# Patient Record
Sex: MALE | Race: WHITE | NOT HISPANIC OR LATINO | URBAN - METROPOLITAN AREA
[De-identification: names, ages, dates, MRNs, and addresses within clinical notes are randomized per-mention and may not be internally consistent; named-entity substitution may affect disease eponyms.]

---

## 2022-04-14 ENCOUNTER — OFFICE (AMBULATORY)
Dept: URBAN - METROPOLITAN AREA CLINIC 64 | Facility: CLINIC | Age: 42
End: 2022-04-14

## 2022-04-14 VITALS — SYSTOLIC BLOOD PRESSURE: 102 MMHG | DIASTOLIC BLOOD PRESSURE: 55 MMHG | HEART RATE: 80 BPM | HEIGHT: 69 IN

## 2022-04-14 DIAGNOSIS — R10.84 GENERALIZED ABDOMINAL PAIN: ICD-10-CM

## 2022-04-14 DIAGNOSIS — K62.5 HEMORRHAGE OF ANUS AND RECTUM: ICD-10-CM

## 2022-04-14 PROCEDURE — 99204 OFFICE O/P NEW MOD 45 MIN: CPT | Performed by: NURSE PRACTITIONER

## 2022-04-14 RX ORDER — DICYCLOMINE HYDROCHLORIDE 10 MG/1
CAPSULE ORAL
Qty: 240 | Refills: 11 | Status: ACTIVE
Start: 2022-04-14

## 2022-04-27 ENCOUNTER — ON CAMPUS - OUTPATIENT (AMBULATORY)
Dept: URBAN - METROPOLITAN AREA HOSPITAL 2 | Facility: HOSPITAL | Age: 42
End: 2022-04-27

## 2022-04-27 ENCOUNTER — OFFICE (AMBULATORY)
Dept: URBAN - METROPOLITAN AREA PATHOLOGY 4 | Facility: PATHOLOGY | Age: 42
End: 2022-04-27

## 2022-04-27 VITALS
HEIGHT: 69 IN | OXYGEN SATURATION: 100 % | DIASTOLIC BLOOD PRESSURE: 58 MMHG | DIASTOLIC BLOOD PRESSURE: 64 MMHG | DIASTOLIC BLOOD PRESSURE: 62 MMHG | OXYGEN SATURATION: 95 % | SYSTOLIC BLOOD PRESSURE: 123 MMHG | DIASTOLIC BLOOD PRESSURE: 63 MMHG | HEART RATE: 73 BPM | DIASTOLIC BLOOD PRESSURE: 61 MMHG | DIASTOLIC BLOOD PRESSURE: 75 MMHG | SYSTOLIC BLOOD PRESSURE: 105 MMHG | HEART RATE: 75 BPM | OXYGEN SATURATION: 97 % | HEART RATE: 63 BPM | HEART RATE: 77 BPM | RESPIRATION RATE: 16 BRPM | RESPIRATION RATE: 18 BRPM | HEART RATE: 84 BPM | SYSTOLIC BLOOD PRESSURE: 111 MMHG | SYSTOLIC BLOOD PRESSURE: 118 MMHG | RESPIRATION RATE: 20 BRPM | WEIGHT: 169 LBS | HEART RATE: 66 BPM | DIASTOLIC BLOOD PRESSURE: 65 MMHG | SYSTOLIC BLOOD PRESSURE: 99 MMHG | HEART RATE: 70 BPM | DIASTOLIC BLOOD PRESSURE: 67 MMHG | SYSTOLIC BLOOD PRESSURE: 98 MMHG | SYSTOLIC BLOOD PRESSURE: 100 MMHG | HEART RATE: 81 BPM | SYSTOLIC BLOOD PRESSURE: 114 MMHG | TEMPERATURE: 98 F | OXYGEN SATURATION: 96 %

## 2022-04-27 DIAGNOSIS — K62.5 HEMORRHAGE OF ANUS AND RECTUM: ICD-10-CM

## 2022-04-27 DIAGNOSIS — R10.84 GENERALIZED ABDOMINAL PAIN: ICD-10-CM

## 2022-04-27 DIAGNOSIS — K57.30 DIVERTICULOSIS OF LARGE INTESTINE WITHOUT PERFORATION OR ABS: ICD-10-CM

## 2022-04-27 DIAGNOSIS — R19.4 CHANGE IN BOWEL HABIT: ICD-10-CM

## 2022-04-27 DIAGNOSIS — K64.1 SECOND DEGREE HEMORRHOIDS: ICD-10-CM

## 2022-04-27 DIAGNOSIS — R19.7 DIARRHEA, UNSPECIFIED: ICD-10-CM

## 2022-04-27 LAB
GI HISTOLOGY: A. UNSPECIFIED: (no result)
GI HISTOLOGY: PDF REPORT: (no result)

## 2022-04-27 PROCEDURE — 45380 COLONOSCOPY AND BIOPSY: CPT | Performed by: INTERNAL MEDICINE

## 2022-04-27 PROCEDURE — 88305 TISSUE EXAM BY PATHOLOGIST: CPT | Performed by: INTERNAL MEDICINE

## 2022-06-20 ENCOUNTER — OFFICE (AMBULATORY)
Dept: URBAN - METROPOLITAN AREA CLINIC 64 | Facility: CLINIC | Age: 42
End: 2022-06-20

## 2022-06-20 VITALS
WEIGHT: 164 LBS | HEIGHT: 69 IN | HEART RATE: 80 BPM | DIASTOLIC BLOOD PRESSURE: 68 MMHG | SYSTOLIC BLOOD PRESSURE: 108 MMHG

## 2022-06-20 DIAGNOSIS — R10.84 GENERALIZED ABDOMINAL PAIN: ICD-10-CM

## 2022-06-20 PROCEDURE — 99214 OFFICE O/P EST MOD 30 MIN: CPT | Performed by: NURSE PRACTITIONER

## 2022-06-20 RX ORDER — RIFAXIMIN 550 MG/1
TABLET ORAL
Qty: 42 | Refills: 2 | Status: COMPLETED
Start: 2022-06-20 | End: 2022-08-05

## 2022-08-05 ENCOUNTER — OFFICE (AMBULATORY)
Dept: URBAN - METROPOLITAN AREA CLINIC 64 | Facility: CLINIC | Age: 42
End: 2022-08-05

## 2022-08-05 VITALS
HEIGHT: 69 IN | DIASTOLIC BLOOD PRESSURE: 82 MMHG | WEIGHT: 164 LBS | HEART RATE: 76 BPM | SYSTOLIC BLOOD PRESSURE: 128 MMHG

## 2022-08-05 DIAGNOSIS — K62.5 HEMORRHAGE OF ANUS AND RECTUM: ICD-10-CM

## 2022-08-05 DIAGNOSIS — K58.0 IRRITABLE BOWEL SYNDROME WITH DIARRHEA: ICD-10-CM

## 2022-08-05 DIAGNOSIS — K64.1 SECOND DEGREE HEMORRHOIDS: ICD-10-CM

## 2022-08-05 DIAGNOSIS — R14.0 ABDOMINAL DISTENSION (GASEOUS): ICD-10-CM

## 2022-08-05 DIAGNOSIS — R10.84 GENERALIZED ABDOMINAL PAIN: ICD-10-CM

## 2022-08-05 DIAGNOSIS — K21.9 GASTRO-ESOPHAGEAL REFLUX DISEASE WITHOUT ESOPHAGITIS: ICD-10-CM

## 2022-08-05 PROCEDURE — 99213 OFFICE O/P EST LOW 20 MIN: CPT

## 2022-08-05 RX ORDER — RIFAXIMIN 550 MG/1
TABLET ORAL
Qty: 42 | Refills: 0 | Status: COMPLETED
End: 2022-10-21

## 2022-10-06 ENCOUNTER — OFFICE VISIT (OUTPATIENT)
Dept: FAMILY MEDICINE CLINIC | Facility: CLINIC | Age: 42
End: 2022-10-06

## 2022-10-06 ENCOUNTER — LAB (OUTPATIENT)
Dept: FAMILY MEDICINE CLINIC | Facility: CLINIC | Age: 42
End: 2022-10-06

## 2022-10-06 VITALS
HEART RATE: 95 BPM | RESPIRATION RATE: 16 BRPM | BODY MASS INDEX: 22.6 KG/M2 | HEIGHT: 69 IN | OXYGEN SATURATION: 97 % | DIASTOLIC BLOOD PRESSURE: 76 MMHG | SYSTOLIC BLOOD PRESSURE: 130 MMHG | TEMPERATURE: 98 F | WEIGHT: 152.6 LBS

## 2022-10-06 DIAGNOSIS — F41.1 GENERALIZED ANXIETY DISORDER: ICD-10-CM

## 2022-10-06 DIAGNOSIS — R63.4 UNINTENTIONAL WEIGHT LOSS: Primary | ICD-10-CM

## 2022-10-06 DIAGNOSIS — F17.210 TOBACCO DEPENDENCE DUE TO CIGARETTES: ICD-10-CM

## 2022-10-06 DIAGNOSIS — Z20.2 POSSIBLE EXPOSURE TO STD: ICD-10-CM

## 2022-10-06 DIAGNOSIS — R06.09 DYSPNEA ON EXERTION: ICD-10-CM

## 2022-10-06 DIAGNOSIS — R63.4 UNINTENTIONAL WEIGHT LOSS: ICD-10-CM

## 2022-10-06 PROBLEM — Z00.00 ANNUAL PHYSICAL EXAM: Status: ACTIVE | Noted: 2022-10-06

## 2022-10-06 LAB
ALBUMIN SERPL-MCNC: 4.9 G/DL (ref 3.5–5.2)
ALBUMIN/GLOB SERPL: 2.6 G/DL
ALP SERPL-CCNC: 80 U/L (ref 39–117)
ALT SERPL W P-5'-P-CCNC: 12 U/L (ref 1–41)
ANION GAP SERPL CALCULATED.3IONS-SCNC: 8 MMOL/L (ref 5–15)
AST SERPL-CCNC: 17 U/L (ref 1–40)
BASOPHILS # BLD AUTO: 0.06 10*3/MM3 (ref 0–0.2)
BASOPHILS NFR BLD AUTO: 0.7 % (ref 0–1.5)
BILIRUB SERPL-MCNC: 0.5 MG/DL (ref 0–1.2)
BILIRUB UR QL STRIP: NEGATIVE
BUN SERPL-MCNC: 8 MG/DL (ref 6–20)
BUN/CREAT SERPL: 9.6 (ref 7–25)
C TRACH RRNA CVX QL NAA+PROBE: NOT DETECTED
CALCIUM SPEC-SCNC: 9.5 MG/DL (ref 8.6–10.5)
CHLORIDE SERPL-SCNC: 102 MMOL/L (ref 98–107)
CLARITY UR: ABNORMAL
CO2 SERPL-SCNC: 30 MMOL/L (ref 22–29)
COLOR UR: YELLOW
CREAT SERPL-MCNC: 0.83 MG/DL (ref 0.76–1.27)
DEPRECATED RDW RBC AUTO: 43.1 FL (ref 37–54)
EGFRCR SERPLBLD CKD-EPI 2021: 112.8 ML/MIN/1.73
EOSINOPHIL # BLD AUTO: 0.09 10*3/MM3 (ref 0–0.4)
EOSINOPHIL NFR BLD AUTO: 1 % (ref 0.3–6.2)
ERYTHROCYTE [DISTWIDTH] IN BLOOD BY AUTOMATED COUNT: 14.1 % (ref 12.3–15.4)
GLOBULIN UR ELPH-MCNC: 1.9 GM/DL
GLUCOSE SERPL-MCNC: 87 MG/DL (ref 65–99)
GLUCOSE UR STRIP-MCNC: NEGATIVE MG/DL
HCT VFR BLD AUTO: 49.7 % (ref 37.5–51)
HGB BLD-MCNC: 17.2 G/DL (ref 13–17.7)
HGB UR QL STRIP.AUTO: NEGATIVE
IMM GRANULOCYTES # BLD AUTO: 0.02 10*3/MM3 (ref 0–0.05)
IMM GRANULOCYTES NFR BLD AUTO: 0.2 % (ref 0–0.5)
KETONES UR QL STRIP: NEGATIVE
LEUKOCYTE ESTERASE UR QL STRIP.AUTO: NEGATIVE
LYMPHOCYTES # BLD AUTO: 2.07 10*3/MM3 (ref 0.7–3.1)
LYMPHOCYTES NFR BLD AUTO: 23.1 % (ref 19.6–45.3)
MCH RBC QN AUTO: 29.9 PG (ref 26.6–33)
MCHC RBC AUTO-ENTMCNC: 34.6 G/DL (ref 31.5–35.7)
MCV RBC AUTO: 86.4 FL (ref 79–97)
MONOCYTES # BLD AUTO: 0.56 10*3/MM3 (ref 0.1–0.9)
MONOCYTES NFR BLD AUTO: 6.3 % (ref 5–12)
N GONORRHOEA RRNA SPEC QL NAA+PROBE: NOT DETECTED
NEUTROPHILS NFR BLD AUTO: 6.15 10*3/MM3 (ref 1.7–7)
NEUTROPHILS NFR BLD AUTO: 68.7 % (ref 42.7–76)
NITRITE UR QL STRIP: NEGATIVE
NRBC BLD AUTO-RTO: 0 /100 WBC (ref 0–0.2)
PH UR STRIP.AUTO: 6 [PH] (ref 5–8)
PLATELET # BLD AUTO: 331 10*3/MM3 (ref 140–450)
PMV BLD AUTO: 10.1 FL (ref 6–12)
POTASSIUM SERPL-SCNC: 3.8 MMOL/L (ref 3.5–5.2)
PROT SERPL-MCNC: 6.8 G/DL (ref 6–8.5)
PROT UR QL STRIP: ABNORMAL
RBC # BLD AUTO: 5.75 10*6/MM3 (ref 4.14–5.8)
SODIUM SERPL-SCNC: 140 MMOL/L (ref 136–145)
SP GR UR STRIP: 1.03 (ref 1–1.03)
TSH SERPL DL<=0.05 MIU/L-ACNC: 1.74 UIU/ML (ref 0.27–4.2)
UROBILINOGEN UR QL STRIP: ABNORMAL
VIT B12 BLD-MCNC: 269 PG/ML (ref 211–946)
WBC NRBC COR # BLD: 8.95 10*3/MM3 (ref 3.4–10.8)

## 2022-10-06 PROCEDURE — 82607 VITAMIN B-12: CPT | Performed by: PHYSICIAN ASSISTANT

## 2022-10-06 PROCEDURE — 87591 N.GONORRHOEAE DNA AMP PROB: CPT | Performed by: PHYSICIAN ASSISTANT

## 2022-10-06 PROCEDURE — 36415 COLL VENOUS BLD VENIPUNCTURE: CPT

## 2022-10-06 PROCEDURE — 99204 OFFICE O/P NEW MOD 45 MIN: CPT | Performed by: PHYSICIAN ASSISTANT

## 2022-10-06 PROCEDURE — 81003 URINALYSIS AUTO W/O SCOPE: CPT | Performed by: PHYSICIAN ASSISTANT

## 2022-10-06 PROCEDURE — 87491 CHLMYD TRACH DNA AMP PROBE: CPT | Performed by: PHYSICIAN ASSISTANT

## 2022-10-06 PROCEDURE — 80050 GENERAL HEALTH PANEL: CPT | Performed by: PHYSICIAN ASSISTANT

## 2022-10-06 NOTE — PROGRESS NOTES
"Subjective   John Camarillo III is a 42 y.o. male.     Chief Complaint   Patient presents with   • Establish Care     Patient has never been seen here.       /76   Pulse 95   Temp 98 °F (36.7 °C) (Infrared)   Resp 16   Ht 175.3 cm (69\")   Wt 69.2 kg (152 lb 9.6 oz)   SpO2 97%   BMI 22.54 kg/m²     BP Readings from Last 3 Encounters:   10/06/22 130/76       Wt Readings from Last 3 Encounters:   10/06/22 69.2 kg (152 lb 9.6 oz)       HPI  Presents to the clinic to establish care as a new patient. He was previously a patient up in [place]. He does have a few different things going on. He has been having weight loss over the past 2 years. He thinks that he has lost over 20 pounds. He has lost around 4 pounds over the past week. He does feel like he is eating on a regular basis. He has a spot on his abdomen that he would like looked at that he is concerned about. He has some pain and a knot in his right ankle that has been bothering him for awhile. He feels like he has been soa for the past 6-7 months that is intermittent in nature. He does feel anxious and actually had a panic attack recently. He had a recent colonoscopy and ent evaluation. He is a current every day smoker. He is very concerned about mold in his house.     The following portions of the patient's history were reviewed and updated as appropriate: allergies, current medications, past family history, past medical history, past social history, past surgical history and problem list.    Review of Systems    Objective   Physical Exam  Constitutional:       Appearance: He is well-developed.   HENT:      Head: Normocephalic and atraumatic.      Right Ear: Tympanic membrane normal.      Left Ear: Tympanic membrane normal.      Nose: No congestion.      Mouth/Throat:      Pharynx: No oropharyngeal exudate or posterior oropharyngeal erythema.   Eyes:      Conjunctiva/sclera: Conjunctivae normal.      Pupils: Pupils are equal, round, and reactive " to light.   Cardiovascular:      Rate and Rhythm: Normal rate and regular rhythm.      Heart sounds: No murmur heard.  Pulmonary:      Effort: Pulmonary effort is normal.      Breath sounds: Normal breath sounds.   Abdominal:      General: Bowel sounds are normal.      Palpations: Abdomen is soft.      Tenderness: There is no abdominal tenderness.   Musculoskeletal:         General: No deformity. Normal range of motion.      Cervical back: Normal range of motion and neck supple.   Lymphadenopathy:      Cervical: No cervical adenopathy.   Skin:     General: Skin is warm and dry.      Findings: No rash.   Neurological:      Mental Status: He is alert and oriented to person, place, and time.      Cranial Nerves: No cranial nerve deficit.   Psychiatric:         Behavior: Behavior normal.         Thought Content: Thought content normal.         Judgment: Judgment normal.           Diagnoses and all orders for this visit:    1. Unintentional weight loss (Primary)  -     CBC w AUTO Differential; Future  -     Comprehensive metabolic panel; Future  -     TSH Rfx On Abnormal To Free T4; Future  -     Vitamin B12; Future  -     Chlamydia trachomatis, Neisseria gonorrhoeae, PCR - , Urine, Clean Catch; Future  -     Cancel: CT Chest Without Contrast; Future  -     CT Chest Without Contrast; Future    2. Generalized anxiety disorder    3. Tobacco dependence due to cigarettes  -     Cancel: CT Chest Without Contrast; Future  -     CT Chest Without Contrast; Future    4. Dyspnea on exertion  -     Cancel: CT Chest Without Contrast; Future  -     CT Chest Without Contrast; Future    5. Possible exposure to STD  -     Urinalysis With Culture If Indicated -; Future    We will start with blood work today. Due to cough, weight loss, mold exposure, and tobacco use we will go ahead and get a ct chest. He is going to stop smoking and seems motivated today to do that. Continue zoloft for anxiety disorder that he was recently started on. He  had colonoscopy and ct abdomen. Need to get copies of ct abdomen. Has had recent dental infection and is on abx. No signs of heart involvement on exam. Follow up in 2 weeks to reevaluate and go over testing.     Return in about 2 weeks (around 10/20/2022), or if symptoms worsen or fail to improve.

## 2022-10-07 ENCOUNTER — TELEPHONE (OUTPATIENT)
Dept: FAMILY MEDICINE CLINIC | Facility: CLINIC | Age: 42
End: 2022-10-07

## 2022-10-17 ENCOUNTER — HOSPITAL ENCOUNTER (OUTPATIENT)
Dept: CT IMAGING | Facility: HOSPITAL | Age: 42
Discharge: HOME | End: 2022-10-17
Admitting: PHYSICIAN ASSISTANT

## 2022-10-17 DIAGNOSIS — F17.210 TOBACCO DEPENDENCE DUE TO CIGARETTES: ICD-10-CM

## 2022-10-17 DIAGNOSIS — R06.09 DYSPNEA ON EXERTION: ICD-10-CM

## 2022-10-17 DIAGNOSIS — R63.4 UNINTENTIONAL WEIGHT LOSS: ICD-10-CM

## 2022-10-17 PROCEDURE — 71250 CT THORAX DX C-: CPT

## 2022-10-20 ENCOUNTER — OFFICE VISIT (OUTPATIENT)
Dept: FAMILY MEDICINE CLINIC | Facility: CLINIC | Age: 42
End: 2022-10-20

## 2022-10-20 VITALS
TEMPERATURE: 97.5 F | BODY MASS INDEX: 23.64 KG/M2 | WEIGHT: 159.6 LBS | OXYGEN SATURATION: 96 % | HEIGHT: 69 IN | DIASTOLIC BLOOD PRESSURE: 70 MMHG | SYSTOLIC BLOOD PRESSURE: 106 MMHG | RESPIRATION RATE: 16 BRPM | HEART RATE: 96 BPM

## 2022-10-20 DIAGNOSIS — F41.1 GENERALIZED ANXIETY DISORDER: ICD-10-CM

## 2022-10-20 DIAGNOSIS — R63.4 UNINTENTIONAL WEIGHT LOSS: Primary | ICD-10-CM

## 2022-10-20 DIAGNOSIS — F17.210 TOBACCO DEPENDENCE DUE TO CIGARETTES: ICD-10-CM

## 2022-10-20 PROCEDURE — 99214 OFFICE O/P EST MOD 30 MIN: CPT | Performed by: PHYSICIAN ASSISTANT

## 2022-10-20 RX ORDER — FLUTICASONE PROPIONATE 50 MCG
2 SPRAY, SUSPENSION (ML) NASAL DAILY
COMMUNITY

## 2022-10-20 RX ORDER — ESOMEPRAZOLE MAGNESIUM 40 MG/1
40 CAPSULE, DELAYED RELEASE ORAL
COMMUNITY

## 2022-10-20 RX ORDER — LORATADINE AND PSEUDOEPHEDRINE SULFATE 5; 120 MG/1; MG/1
1 TABLET, EXTENDED RELEASE ORAL 2 TIMES DAILY
COMMUNITY

## 2022-10-20 NOTE — PROGRESS NOTES
"Jaz Camarillo III is a 42 y.o. male.     Chief Complaint   Patient presents with   • Follow-up     Follow up on ct and labs 2 weeks ago       /70   Pulse 96   Temp 97.5 °F (36.4 °C) (Infrared)   Resp 16   Ht 175.3 cm (69.02\")   Wt 72.4 kg (159 lb 9.6 oz)   SpO2 96%   BMI 23.56 kg/m²     BP Readings from Last 3 Encounters:   10/20/22 106/70   10/06/22 130/76       Wt Readings from Last 3 Encounters:   10/20/22 72.4 kg (159 lb 9.6 oz)   10/06/22 69.2 kg (152 lb 9.6 oz)       HPI Presents to the clinic for follow up on weight loss, abdominal pain, and anxiety. He states that he actually has been doing better now than he has in years. We switched him to nexium and he has done a lot better on that. He has gained weight and he has his appetite back. He is getting his house looked at for the mold exposure and he will need to get this treated. Ct scan came back ok without issues. Blood work that we did looked good. He is concerned about his toe nails- states that they are dark.     The following portions of the patient's history were reviewed and updated as appropriate: allergies, current medications, past family history, past medical history, past social history, past surgical history and problem list.    Review of Systems    Objective   Physical Exam  Constitutional:       Appearance: Normal appearance.   Eyes:      Extraocular Movements: Extraocular movements intact.      Pupils: Pupils are equal, round, and reactive to light.   Cardiovascular:      Rate and Rhythm: Normal rate.      Heart sounds: No murmur heard.  Pulmonary:      Effort: Pulmonary effort is normal.      Breath sounds: No wheezing.   Neurological:      General: No focal deficit present.      Mental Status: He is alert and oriented to person, place, and time.   Psychiatric:         Mood and Affect: Mood normal.         Behavior: Behavior normal.           Diagnoses and all orders for this visit:    1. Unintentional weight loss " (Primary)  Comments:  better. appetite increased and has gained 7 pounds. workup unremarkable.     2. Generalized anxiety disorder  Comments:  doing better.     3. Tobacco dependence due to cigarettes      During this visit I counseled the patient 3-5 minutes on tobacco cessation. I discussed the risks of continued tobacco abuse and offered therapy to help with cessation.       Return if symptoms worsen or fail to improve.

## 2022-10-21 ENCOUNTER — OFFICE (AMBULATORY)
Dept: URBAN - METROPOLITAN AREA CLINIC 64 | Facility: CLINIC | Age: 42
End: 2022-10-21

## 2022-10-21 VITALS
DIASTOLIC BLOOD PRESSURE: 98 MMHG | HEART RATE: 98 BPM | WEIGHT: 162 LBS | HEIGHT: 69 IN | SYSTOLIC BLOOD PRESSURE: 145 MMHG

## 2022-10-21 DIAGNOSIS — K64.1 SECOND DEGREE HEMORRHOIDS: ICD-10-CM

## 2022-10-21 DIAGNOSIS — K21.9 GASTRO-ESOPHAGEAL REFLUX DISEASE WITHOUT ESOPHAGITIS: ICD-10-CM

## 2022-10-21 DIAGNOSIS — K62.5 HEMORRHAGE OF ANUS AND RECTUM: ICD-10-CM

## 2022-10-21 PROBLEM — R19.7 DIARRHEA: Status: ACTIVE | Noted: 2022-04-27

## 2022-10-21 PROCEDURE — 99213 OFFICE O/P EST LOW 20 MIN: CPT

## 2022-10-28 ENCOUNTER — OFFICE (AMBULATORY)
Dept: URBAN - METROPOLITAN AREA CLINIC 64 | Facility: CLINIC | Age: 42
End: 2022-10-28

## 2022-10-28 VITALS — HEIGHT: 69 IN

## 2022-10-28 DIAGNOSIS — K64.1 SECOND DEGREE HEMORRHOIDS: ICD-10-CM

## 2022-10-28 PROCEDURE — 46221 LIGATION OF HEMORRHOID(S): CPT | Performed by: INTERNAL MEDICINE

## 2022-11-11 ENCOUNTER — OFFICE (AMBULATORY)
Dept: URBAN - METROPOLITAN AREA CLINIC 64 | Facility: CLINIC | Age: 42
End: 2022-11-11

## 2022-11-11 VITALS — HEIGHT: 69 IN

## 2022-11-11 DIAGNOSIS — K64.1 SECOND DEGREE HEMORRHOIDS: ICD-10-CM

## 2022-11-11 PROCEDURE — 46221 LIGATION OF HEMORRHOID(S): CPT | Performed by: INTERNAL MEDICINE

## 2022-11-23 ENCOUNTER — OFFICE (AMBULATORY)
Dept: URBAN - METROPOLITAN AREA CLINIC 64 | Facility: CLINIC | Age: 42
End: 2022-11-23

## 2022-11-23 VITALS — HEIGHT: 69 IN

## 2022-11-23 DIAGNOSIS — K64.1 SECOND DEGREE HEMORRHOIDS: ICD-10-CM

## 2022-11-23 PROCEDURE — 46221 LIGATION OF HEMORRHOID(S): CPT | Performed by: INTERNAL MEDICINE

## 2023-02-16 ENCOUNTER — OFFICE VISIT (OUTPATIENT)
Dept: FAMILY MEDICINE CLINIC | Facility: CLINIC | Age: 43
End: 2023-02-16
Payer: COMMERCIAL

## 2023-02-16 VITALS
DIASTOLIC BLOOD PRESSURE: 78 MMHG | HEIGHT: 69 IN | SYSTOLIC BLOOD PRESSURE: 128 MMHG | RESPIRATION RATE: 18 BRPM | TEMPERATURE: 97.5 F | WEIGHT: 183.2 LBS | OXYGEN SATURATION: 96 % | HEART RATE: 90 BPM | BODY MASS INDEX: 27.13 KG/M2

## 2023-02-16 DIAGNOSIS — R10.12 INTERMITTENT LEFT UPPER QUADRANT ABDOMINAL PAIN: ICD-10-CM

## 2023-02-16 DIAGNOSIS — H69.82 DYSFUNCTION OF LEFT EUSTACHIAN TUBE: ICD-10-CM

## 2023-02-16 DIAGNOSIS — G89.29 CHRONIC LEFT EAR PAIN: Primary | ICD-10-CM

## 2023-02-16 DIAGNOSIS — H92.02 CHRONIC LEFT EAR PAIN: Primary | ICD-10-CM

## 2023-02-16 DIAGNOSIS — L81.9 CHANGE IN PIGMENTED SKIN LESION OF FACE: ICD-10-CM

## 2023-02-16 PROBLEM — H69.92 DYSFUNCTION OF LEFT EUSTACHIAN TUBE: Status: ACTIVE | Noted: 2023-02-16

## 2023-02-16 PROCEDURE — 99214 OFFICE O/P EST MOD 30 MIN: CPT | Performed by: PHYSICIAN ASSISTANT

## 2023-02-16 NOTE — PROGRESS NOTES
"Jaz Camarillo III is a 42 y.o. male.     Chief Complaint   Patient presents with   • lower rib pain     Lower rib pain X 1 week. No known injury       /78   Pulse 90   Temp 97.5 °F (36.4 °C) (Infrared)   Resp 18   Ht 175.3 cm (69.02\")   Wt 83.1 kg (183 lb 3.2 oz)   SpO2 96%   BMI 27.04 kg/m²     BP Readings from Last 3 Encounters:   02/16/23 128/78   10/20/22 106/70   10/06/22 130/76       Wt Readings from Last 3 Encounters:   02/16/23 83.1 kg (183 lb 3.2 oz)   10/20/22 72.4 kg (159 lb 9.6 oz)   10/06/22 69.2 kg (152 lb 9.6 oz)       HPI Presents to the clinic for lower rib pain on the left and the right side. Worse on the left side. He is not sure if he did something to the area. No constipation. Had diarrhea for a few days. This has resolved. No coughing. He also has a dark spot on his face he would like to look at. Girlfriend has noticed it getting bigger. He complains of left ear issues where he will get issues where he feels like it swells shut.     The following portions of the patient's history were reviewed and updated as appropriate: allergies, current medications, past family history, past medical history, past social history, past surgical history and problem list.    Review of Systems    Objective   Physical Exam  Constitutional:       Appearance: Normal appearance.   HENT:      Right Ear: Tympanic membrane normal.      Left Ear: Tympanic membrane normal.      Nose: No congestion.      Mouth/Throat:      Pharynx: No oropharyngeal exudate or posterior oropharyngeal erythema.   Eyes:      Extraocular Movements: Extraocular movements intact.      Pupils: Pupils are equal, round, and reactive to light.   Cardiovascular:      Rate and Rhythm: Normal rate.      Heart sounds: No murmur heard.  Pulmonary:      Effort: Pulmonary effort is normal.      Breath sounds: No wheezing.   Abdominal:      Tenderness: There is abdominal tenderness (left upper quadrant).   Musculoskeletal:         " General: No tenderness.   Skin:     Findings: Lesion (right temporal- dark, not raised, irregular borders) present.   Neurological:      General: No focal deficit present.      Mental Status: He is alert and oriented to person, place, and time.   Psychiatric:         Mood and Affect: Mood normal.         Behavior: Behavior normal.           Diagnoses and all orders for this visit:    1. Chronic left ear pain (Primary)  Comments:  get back into ent- check ct temporal bone.   Orders:  -     CT Temporal Bones Without Contrast; Future    2. Dysfunction of left eustachian tube  Comments:  as above.   Orders:  -     CT Temporal Bones Without Contrast; Future    3. Change in pigmented skin lesion of face  Comments:  derm referral placed.   Orders:  -     Ambulatory Referral to Dermatology    4. Intermittent left upper quadrant abdominal pain  Comments:  monitor- seems to be msk in nature.         Return in about 6 months (around 8/16/2023), or if symptoms worsen or fail to improve.

## 2023-02-28 ENCOUNTER — HOSPITAL ENCOUNTER (OUTPATIENT)
Dept: CT IMAGING | Facility: HOSPITAL | Age: 43
Discharge: HOME | End: 2023-02-28
Admitting: PHYSICIAN ASSISTANT
Payer: COMMERCIAL

## 2023-02-28 DIAGNOSIS — H69.82 DYSFUNCTION OF LEFT EUSTACHIAN TUBE: ICD-10-CM

## 2023-02-28 DIAGNOSIS — H92.02 CHRONIC LEFT EAR PAIN: ICD-10-CM

## 2023-02-28 DIAGNOSIS — G89.29 CHRONIC LEFT EAR PAIN: ICD-10-CM

## 2023-02-28 PROCEDURE — 70480 CT ORBIT/EAR/FOSSA W/O DYE: CPT

## 2023-03-03 ENCOUNTER — TELEPHONE (OUTPATIENT)
Dept: FAMILY MEDICINE CLINIC | Facility: CLINIC | Age: 43
End: 2023-03-03
Payer: COMMERCIAL

## 2023-03-03 NOTE — TELEPHONE ENCOUNTER
Caller: John Camarillo III    Relationship: Self    Best call back number: [phone]    What is the best time to reach you: ANYTIME AFTER 3:00 P    Who are you requesting to speak with (clinical staff, provider,  specific staff member): CLINICAL    What was the call regarding:     HAS SOME MORE QUESTIONS REGARDING CT SCAN    Do you require a callback: YES

## 2023-03-03 NOTE — TELEPHONE ENCOUNTER
Patient was notified of the message below.  Patient has seen a ENT at [facility] can the referral be made to them please.                ----- Message from Eamon German PA-C sent at 3/2/2023 11:56 AM EST -----  Needs to get back in to see ent for cholesteatoma

## 2023-03-06 NOTE — TELEPHONE ENCOUNTER
Patient advised to keep arms clean and use neosporin on them till he sees the dermatologist             HUB TO SHARE  Patient would like to have appointment scheduled for ENT and Dermatology . Referral put in for both. Thank you                Please put referral in to [facility]. Phone number is [phone].  Fax number is [phone]. Thank you

## 2023-03-07 DIAGNOSIS — H71.92 CHOLESTEATOMA OF LEFT EAR: Primary | ICD-10-CM

## 2023-04-04 ENCOUNTER — OFFICE VISIT (OUTPATIENT)
Dept: FAMILY MEDICINE CLINIC | Facility: CLINIC | Age: 43
End: 2023-04-04
Payer: COMMERCIAL

## 2023-04-04 VITALS
WEIGHT: 180.4 LBS | DIASTOLIC BLOOD PRESSURE: 71 MMHG | SYSTOLIC BLOOD PRESSURE: 136 MMHG | OXYGEN SATURATION: 97 % | HEIGHT: 69 IN | BODY MASS INDEX: 26.72 KG/M2 | TEMPERATURE: 97.3 F | HEART RATE: 96 BPM | RESPIRATION RATE: 20 BRPM

## 2023-04-04 DIAGNOSIS — H71.92 CHOLESTEATOMA OF LEFT EAR: ICD-10-CM

## 2023-04-04 DIAGNOSIS — N48.9 DISORDER OF PENIS: Primary | ICD-10-CM

## 2023-04-04 PROCEDURE — 99213 OFFICE O/P EST LOW 20 MIN: CPT | Performed by: PHYSICIAN ASSISTANT

## 2023-04-04 NOTE — PROGRESS NOTES
"Jaz Camarillo III is a 42 y.o. male.     Chief Complaint   Patient presents with   • lump in groin area X 2 weeks     Also patient is needing an appointment to see ENT       /71   Pulse 96   Temp 97.3 °F (36.3 °C) (Infrared)   Resp 20   Ht 175.3 cm (69.02\")   Wt 81.8 kg (180 lb 6.4 oz)   SpO2 97%   BMI 26.63 kg/m²     BP Readings from Last 3 Encounters:   04/04/23 136/71   02/16/23 128/78   10/20/22 106/70       Wt Readings from Last 3 Encounters:   04/04/23 81.8 kg (180 lb 6.4 oz)   02/16/23 83.1 kg (183 lb 3.2 oz)   10/20/22 72.4 kg (159 lb 9.6 oz)       HPI Presents to the clinic for lesion on penis. He states that it has been present over the past 2 weeks. He states that he it looks like a vein. He also has not heard from ENT. Has cholesteatoma. No testicular pain. No discharge.     The following portions of the patient's history were reviewed and updated as appropriate: allergies, current medications, past family history, past medical history, past social history, past surgical history and problem list.    Review of Systems    Objective   Physical Exam  Constitutional:       Appearance: Normal appearance.   Eyes:      Extraocular Movements: Extraocular movements intact.      Pupils: Pupils are equal, round, and reactive to light.   Genitourinary:     Comments: Hardened vessel on lateral edge of penis  Neurological:      General: No focal deficit present.      Mental Status: He is alert and oriented to person, place, and time.   Psychiatric:         Mood and Affect: Mood normal.         Behavior: Behavior normal.           Diagnoses and all orders for this visit:    1. Disorder of penis (Primary)  -     Ambulatory Referral to Urology    2. Cholesteatoma of left ear    has what appears to be phlebitis- naproxen. Heat. If doesn't improve see urology.     Return if symptoms worsen or fail to improve.  "

## 2023-05-11 ENCOUNTER — OFFICE VISIT (OUTPATIENT)
Dept: FAMILY MEDICINE CLINIC | Facility: CLINIC | Age: 43
End: 2023-05-11

## 2023-05-11 VITALS
BODY MASS INDEX: 27.25 KG/M2 | HEART RATE: 98 BPM | RESPIRATION RATE: 18 BRPM | HEIGHT: 69 IN | TEMPERATURE: 97.1 F | SYSTOLIC BLOOD PRESSURE: 108 MMHG | DIASTOLIC BLOOD PRESSURE: 69 MMHG | OXYGEN SATURATION: 95 % | WEIGHT: 184 LBS

## 2023-05-11 DIAGNOSIS — R42 VERTIGO: ICD-10-CM

## 2023-05-11 DIAGNOSIS — S29.012A STRAIN OF THORACIC BACK REGION: Primary | ICD-10-CM

## 2023-05-11 RX ORDER — DICLOFENAC SODIUM 75 MG/1
75 TABLET, DELAYED RELEASE ORAL 2 TIMES DAILY
Qty: 30 TABLET | Refills: 0 | Status: SHIPPED | OUTPATIENT
Start: 2023-05-11 | End: 2023-05-24 | Stop reason: SDUPTHER

## 2023-05-11 RX ORDER — CYCLOBENZAPRINE HCL 10 MG
10 TABLET ORAL 3 TIMES DAILY PRN
Qty: 15 TABLET | Refills: 3 | Status: SHIPPED | OUTPATIENT
Start: 2023-05-11

## 2023-05-11 NOTE — PROGRESS NOTES
"Jaz Camarillo III is a 43 y.o. male.     Chief Complaint   Patient presents with   • Pain     Last night patient started with low back pain, pain between his shoulders. Today dizziness , nausea, patient did have chest pain and tightness 4 days ago       /69   Pulse 98   Temp 97.1 °F (36.2 °C) (Infrared)   Resp 18   Ht 175.3 cm (69.02\")   Wt 83.5 kg (184 lb)   SpO2 95%   BMI 27.16 kg/m²     BP Readings from Last 3 Encounters:   05/11/23 108/69   04/04/23 136/71   02/16/23 128/78       Wt Readings from Last 3 Encounters:   05/11/23 83.5 kg (184 lb)   04/04/23 81.8 kg (180 lb 6.4 oz)   02/16/23 83.1 kg (183 lb 3.2 oz)       HPI Presents for back pain and some dizziness. Was working and lifting. Worse with movement but is improving. Saw ENT for possible cholesteatoma. Said he needs to see [facility] but mabye not at this time. Dizziness has improved some. No chest pain or pressure. No soa associated.     The following portions of the patient's history were reviewed and updated as appropriate: allergies, current medications, past family history, past medical history, past social history, past surgical history and problem list.    Review of Systems    Objective   Physical Exam  Constitutional:       Appearance: Normal appearance.   HENT:      Right Ear: Tympanic membrane normal.      Left Ear: Tympanic membrane normal.      Nose: No congestion.   Eyes:      Extraocular Movements: Extraocular movements intact.      Pupils: Pupils are equal, round, and reactive to light.   Cardiovascular:      Rate and Rhythm: Normal rate.      Heart sounds: No murmur heard.  Pulmonary:      Effort: Pulmonary effort is normal.      Breath sounds: No wheezing.   Musculoskeletal:         General: Tenderness (mild mid back) present.   Neurological:      General: No focal deficit present.      Mental Status: He is alert and oriented to person, place, and time.   Psychiatric:         Mood and Affect: Mood normal.         " Behavior: Behavior normal.           Diagnoses and all orders for this visit:    1. Strain of thoracic back region (Primary)    2. Vertigo    Other orders  -     Discontinue: diclofenac (VOLTAREN) 75 MG EC tablet; Take 1 tablet by mouth 2 (Two) Times a Day.  Dispense: 30 tablet; Refill: 0  -     cyclobenzaprine (FLEXERIL) 10 MG tablet; Take 1 tablet by mouth 3 (Three) Times a Day As Needed for Muscle Spasms.  Dispense: 15 tablet; Refill: 3        No follow-ups on file.

## 2023-05-24 RX ORDER — DICLOFENAC SODIUM 75 MG/1
75 TABLET, DELAYED RELEASE ORAL 2 TIMES DAILY
Qty: 30 TABLET | Refills: 0 | Status: SHIPPED | OUTPATIENT
Start: 2023-05-24

## 2023-05-31 PROBLEM — S29.012A STRAIN OF THORACIC BACK REGION: Status: ACTIVE | Noted: 2023-05-31

## 2023-05-31 PROBLEM — R42 VERTIGO: Status: ACTIVE | Noted: 2023-05-31

## 2023-06-15 RX ORDER — DICLOFENAC SODIUM 75 MG/1
75 TABLET, DELAYED RELEASE ORAL 2 TIMES DAILY
Qty: 90 TABLET | Refills: 1 | Status: SHIPPED | OUTPATIENT
Start: 2023-06-15

## 2023-08-10 ENCOUNTER — OFFICE VISIT (OUTPATIENT)
Dept: FAMILY MEDICINE CLINIC | Facility: CLINIC | Age: 43
End: 2023-08-10
Payer: COMMERCIAL

## 2023-08-10 VITALS
RESPIRATION RATE: 16 BRPM | HEIGHT: 69 IN | OXYGEN SATURATION: 98 % | SYSTOLIC BLOOD PRESSURE: 106 MMHG | HEART RATE: 92 BPM | DIASTOLIC BLOOD PRESSURE: 68 MMHG | TEMPERATURE: 98.2 F | BODY MASS INDEX: 26.1 KG/M2 | WEIGHT: 176.2 LBS

## 2023-08-10 DIAGNOSIS — K21.00 GASTROESOPHAGEAL REFLUX DISEASE WITH ESOPHAGITIS WITHOUT HEMORRHAGE: Primary | ICD-10-CM

## 2023-08-10 DIAGNOSIS — H74.92 MASTOID DISORDER, LEFT: ICD-10-CM

## 2023-08-10 PROCEDURE — 99214 OFFICE O/P EST MOD 30 MIN: CPT | Performed by: PHYSICIAN ASSISTANT

## 2023-08-10 RX ORDER — LEVOFLOXACIN 750 MG/1
750 TABLET ORAL DAILY
Qty: 5 TABLET | Refills: 0 | Status: SHIPPED | OUTPATIENT
Start: 2023-08-10 | End: 2023-08-15

## 2023-08-10 NOTE — PROGRESS NOTES
"Jaz Camarillo III is a 43 y.o. male.     Chief Complaint   Patient presents with    Neck Pain     Neck pain and headaches X 2 weeks. No known injury. For 2 days patient has also had throwing up and diarrhea       /68   Pulse 92   Temp 98.2 øF (36.8 øC) (Infrared)   Resp 16   Ht 175.3 cm (69.02\")   Wt 79.9 kg (176 lb 3.2 oz)   SpO2 98%   BMI 26.01 kg/mý     BP Readings from Last 3 Encounters:   08/10/23 106/68   05/11/23 108/69   04/04/23 136/71       Wt Readings from Last 3 Encounters:   08/10/23 79.9 kg (176 lb 3.2 oz)   05/11/23 83.5 kg (184 lb)   04/04/23 81.8 kg (180 lb 6.4 oz)       Neck Pain   Presents to the clinic  for neck pain, ear pain, gerd, and headaches. He has had some of these issues for quite some time but some are worsening. He has been having reflux and has had issues with dysphagia. He feels like things are getting stuck when he eats. Takes ppi. He has a cholesteatoma and mastoid effusion. No fever but feels pressure in the ear with dizziness. He has had diarrhea and vomiting but this has improved. Went to the ER for this. Following with ENT as well on an outpatient basis.     The following portions of the patient's history were reviewed and updated as appropriate: allergies, current medications, past family history, past medical history, past social history, past surgical history, and problem list.    Review of Systems   Musculoskeletal:  Positive for neck pain.     Objective   Physical Exam  Constitutional:       Appearance: Normal appearance.   Eyes:      Extraocular Movements: Extraocular movements intact.      Pupils: Pupils are equal, round, and reactive to light.   Cardiovascular:      Rate and Rhythm: Normal rate.      Heart sounds: No murmur heard.  Pulmonary:      Effort: Pulmonary effort is normal.      Breath sounds: No wheezing.   Musculoskeletal:         General: Tenderness (pain and tender mobile lesion behind the left mastoid.) present.   Neurological:    "   General: No focal deficit present.      Mental Status: He is alert and oriented to person, place, and time.   Psychiatric:         Mood and Affect: Mood normal.         Behavior: Behavior normal.         Diagnoses and all orders for this visit:    1. Gastroesophageal reflux disease with esophagitis without hemorrhage (Primary)  -     Ambulatory referral for Screening EGD    2. Mastoid disorder, left    Other orders  -     levoFLOXacin (Levaquin) 750 MG tablet; Take 1 tablet by mouth Daily for 5 days.  Dispense: 5 tablet; Refill: 0    Gregoria has swelling and a mobile tender lesion behind mastoid. Could be lymphadenopathy but could also be cyst vs other lesion. I want him to take the levaquin and then after he finishes to let me know if this resolves and the pain resolves. If this does not we will need to either ultrasound this or check a ct soft tissue. I want him to follow back up with ENT and we will also go ahead and get an EGD for dysphagia.     No follow-ups on file.

## 2023-10-03 ENCOUNTER — OFFICE VISIT (OUTPATIENT)
Dept: FAMILY MEDICINE CLINIC | Facility: CLINIC | Age: 43
End: 2023-10-03
Payer: COMMERCIAL

## 2023-10-03 VITALS
SYSTOLIC BLOOD PRESSURE: 119 MMHG | TEMPERATURE: 97.8 F | RESPIRATION RATE: 16 BRPM | WEIGHT: 178.8 LBS | OXYGEN SATURATION: 97 % | BODY MASS INDEX: 26.48 KG/M2 | DIASTOLIC BLOOD PRESSURE: 82 MMHG | HEIGHT: 69 IN | HEART RATE: 105 BPM

## 2023-10-03 DIAGNOSIS — M26.609 TMJ (TEMPOROMANDIBULAR JOINT DISORDER): Primary | ICD-10-CM

## 2023-10-03 DIAGNOSIS — R22.1 LOCALIZED SWELLING, MASS AND LUMP, NECK: ICD-10-CM

## 2023-10-03 PROCEDURE — 99214 OFFICE O/P EST MOD 30 MIN: CPT | Performed by: PHYSICIAN ASSISTANT

## 2023-10-03 RX ORDER — METHYLPREDNISOLONE 4 MG/1
TABLET ORAL
Qty: 21 TABLET | Refills: 0 | Status: SHIPPED | OUTPATIENT
Start: 2023-10-03

## 2023-10-03 NOTE — PROGRESS NOTES
"Jaz Camarillo III is a 43 y.o. male.     Chief Complaint   Patient presents with    Jaw Pain     Right jaw pain X 2 weeks       /82   Pulse 105   Temp 97.8 °F (36.6 °C) (Infrared)   Resp 16   Ht 175.3 cm (69.02\")   Wt 81.1 kg (178 lb 12.8 oz)   SpO2 97%   BMI 26.39 kg/m²     BP Readings from Last 3 Encounters:   10/03/23 119/82   08/10/23 106/68   05/11/23 108/69       Wt Readings from Last 3 Encounters:   10/03/23 81.1 kg (178 lb 12.8 oz)   08/10/23 79.9 kg (176 lb 3.2 oz)   05/11/23 83.5 kg (184 lb)       Jaw Pain   Presents to the clinic for right jaw pain. Present for 2 weeks. Worse when he eats. No dental work. Has had headaches. Has swelling in back left neck. Was present last visit but hasn't improved. Grinds teeth at night. Dentist was concerned about this.     The following portions of the patient's history were reviewed and updated as appropriate: allergies, current medications, past family history, past medical history, past social history, past surgical history, and problem list.    Review of Systems    Objective   Physical Exam  Constitutional:       Appearance: Normal appearance.   HENT:      Right Ear: Tympanic membrane normal.      Left Ear: Tympanic membrane normal.      Ears:      Comments: TTP over right TMJ    Eyes:      Extraocular Movements: Extraocular movements intact.      Pupils: Pupils are equal, round, and reactive to light.   Skin:     Comments: Mobile fluctuant lesion under skin left posterior neck.    Neurological:      General: No focal deficit present.      Mental Status: He is alert and oriented to person, place, and time.   Psychiatric:         Mood and Affect: Mood normal.         Behavior: Behavior normal.         Diagnoses and all orders for this visit:    1. TMJ (temporomandibular joint disorder) (Primary)  Comments:  take home flexeril. steroid if not better in the next couple days. see dentist for mouthguard.    2. Localized swelling, mass and lump, " neck  -     US Head Neck Soft Tissue; Future    Other orders  -     methylPREDNISolone (MEDROL) 4 MG dose pack; Take as directed on package instructions.  Dispense: 21 tablet; Refill: 0        No follow-ups on file.

## 2023-10-05 ENCOUNTER — OFFICE (AMBULATORY)
Dept: URBAN - METROPOLITAN AREA CLINIC 64 | Facility: CLINIC | Age: 43
End: 2023-10-05

## 2023-10-05 VITALS
WEIGHT: 181 LBS | DIASTOLIC BLOOD PRESSURE: 88 MMHG | SYSTOLIC BLOOD PRESSURE: 134 MMHG | HEIGHT: 69 IN | HEART RATE: 99 BPM

## 2023-10-05 DIAGNOSIS — K21.9 GASTRO-ESOPHAGEAL REFLUX DISEASE WITHOUT ESOPHAGITIS: ICD-10-CM

## 2023-10-05 DIAGNOSIS — K64.8 OTHER HEMORRHOIDS: ICD-10-CM

## 2023-10-05 DIAGNOSIS — K62.5 HEMORRHAGE OF ANUS AND RECTUM: ICD-10-CM

## 2023-10-05 DIAGNOSIS — K59.00 CONSTIPATION, UNSPECIFIED: ICD-10-CM

## 2023-10-05 PROCEDURE — 99214 OFFICE O/P EST MOD 30 MIN: CPT | Performed by: INTERNAL MEDICINE

## 2023-10-05 RX ORDER — PANTOPRAZOLE SODIUM 40 MG/1
40 TABLET, DELAYED RELEASE ORAL
Qty: 90 | Refills: 3 | Status: COMPLETED
Start: 2023-10-05 | End: 2024-01-04

## 2023-10-10 ENCOUNTER — HOSPITAL ENCOUNTER (OUTPATIENT)
Dept: ULTRASOUND IMAGING | Facility: HOSPITAL | Age: 43
Discharge: HOME | End: 2023-10-10
Admitting: PHYSICIAN ASSISTANT
Payer: COMMERCIAL

## 2023-10-10 DIAGNOSIS — R22.1 LOCALIZED SWELLING, MASS AND LUMP, NECK: ICD-10-CM

## 2023-10-10 PROCEDURE — 76536 US EXAM OF HEAD AND NECK: CPT

## 2023-10-30 ENCOUNTER — OFFICE (AMBULATORY)
Dept: URBAN - METROPOLITAN AREA CLINIC 64 | Facility: CLINIC | Age: 43
End: 2023-10-30

## 2023-10-30 VITALS — HEIGHT: 69 IN

## 2023-10-30 DIAGNOSIS — K64.1 SECOND DEGREE HEMORRHOIDS: ICD-10-CM

## 2023-10-30 DIAGNOSIS — K62.5 HEMORRHAGE OF ANUS AND RECTUM: ICD-10-CM

## 2023-10-30 PROCEDURE — 46221 LIGATION OF HEMORRHOID(S): CPT | Performed by: INTERNAL MEDICINE

## 2023-11-08 ENCOUNTER — OFFICE (AMBULATORY)
Dept: URBAN - METROPOLITAN AREA PATHOLOGY 4 | Facility: PATHOLOGY | Age: 43
End: 2023-11-08

## 2023-11-08 ENCOUNTER — ON CAMPUS - OUTPATIENT (AMBULATORY)
Dept: URBAN - METROPOLITAN AREA HOSPITAL 2 | Facility: HOSPITAL | Age: 43
End: 2023-11-08

## 2023-11-08 VITALS
TEMPERATURE: 97.7 F | SYSTOLIC BLOOD PRESSURE: 118 MMHG | HEART RATE: 84 BPM | SYSTOLIC BLOOD PRESSURE: 120 MMHG | DIASTOLIC BLOOD PRESSURE: 84 MMHG | RESPIRATION RATE: 17 BRPM | OXYGEN SATURATION: 98 % | OXYGEN SATURATION: 95 % | HEART RATE: 80 BPM | HEART RATE: 89 BPM | WEIGHT: 182 LBS | OXYGEN SATURATION: 99 % | HEART RATE: 101 BPM | DIASTOLIC BLOOD PRESSURE: 66 MMHG | HEART RATE: 90 BPM | HEART RATE: 86 BPM | OXYGEN SATURATION: 94 % | DIASTOLIC BLOOD PRESSURE: 83 MMHG | HEIGHT: 69 IN | RESPIRATION RATE: 16 BRPM | SYSTOLIC BLOOD PRESSURE: 121 MMHG | SYSTOLIC BLOOD PRESSURE: 134 MMHG | SYSTOLIC BLOOD PRESSURE: 101 MMHG | DIASTOLIC BLOOD PRESSURE: 81 MMHG | SYSTOLIC BLOOD PRESSURE: 122 MMHG | DIASTOLIC BLOOD PRESSURE: 90 MMHG

## 2023-11-08 DIAGNOSIS — K44.9 DIAPHRAGMATIC HERNIA WITHOUT OBSTRUCTION OR GANGRENE: ICD-10-CM

## 2023-11-08 DIAGNOSIS — K21.00 GASTRO-ESOPHAGEAL REFLUX DISEASE WITH ESOPHAGITIS, WITHOUT B: ICD-10-CM

## 2023-11-08 DIAGNOSIS — K22.70 BARRETT'S ESOPHAGUS WITHOUT DYSPLASIA: ICD-10-CM

## 2023-11-08 LAB
GI HISTOLOGY: A. SELECT: (no result)
GI HISTOLOGY: B. SELECT: (no result)
GI HISTOLOGY: C. SELECT: (no result)
GI HISTOLOGY: PDF REPORT: (no result)

## 2023-11-08 PROCEDURE — 88305 TISSUE EXAM BY PATHOLOGIST: CPT | Performed by: INTERNAL MEDICINE

## 2023-11-08 PROCEDURE — 43239 EGD BIOPSY SINGLE/MULTIPLE: CPT | Performed by: INTERNAL MEDICINE

## 2023-11-08 RX ORDER — SUCRALFATE 1 G/1
TABLET ORAL
Qty: 180 | Refills: 0 | Status: COMPLETED
End: 2024-01-04

## 2023-12-05 ENCOUNTER — OFFICE VISIT (OUTPATIENT)
Dept: FAMILY MEDICINE CLINIC | Facility: CLINIC | Age: 43
End: 2023-12-05
Payer: COMMERCIAL

## 2023-12-05 ENCOUNTER — LAB (OUTPATIENT)
Dept: FAMILY MEDICINE CLINIC | Facility: CLINIC | Age: 43
End: 2023-12-05
Payer: COMMERCIAL

## 2023-12-05 VITALS
RESPIRATION RATE: 16 BRPM | WEIGHT: 193.8 LBS | OXYGEN SATURATION: 94 % | DIASTOLIC BLOOD PRESSURE: 92 MMHG | HEIGHT: 69 IN | SYSTOLIC BLOOD PRESSURE: 132 MMHG | BODY MASS INDEX: 28.71 KG/M2 | HEART RATE: 102 BPM

## 2023-12-05 DIAGNOSIS — K22.70 BARRETT'S ESOPHAGUS WITHOUT DYSPLASIA: ICD-10-CM

## 2023-12-05 DIAGNOSIS — Z11.59 NEED FOR HEPATITIS C SCREENING TEST: ICD-10-CM

## 2023-12-05 DIAGNOSIS — Z00.00 ANNUAL PHYSICAL EXAM: ICD-10-CM

## 2023-12-05 DIAGNOSIS — K44.9 HIATAL HERNIA: ICD-10-CM

## 2023-12-05 DIAGNOSIS — Z00.00 ANNUAL PHYSICAL EXAM: Primary | ICD-10-CM

## 2023-12-05 LAB — TSH SERPL DL<=0.05 MIU/L-ACNC: 2.78 UIU/ML (ref 0.27–4.2)

## 2023-12-05 PROCEDURE — 82306 VITAMIN D 25 HYDROXY: CPT | Performed by: PHYSICIAN ASSISTANT

## 2023-12-05 PROCEDURE — 80050 GENERAL HEALTH PANEL: CPT | Performed by: PHYSICIAN ASSISTANT

## 2023-12-05 PROCEDURE — 80061 LIPID PANEL: CPT | Performed by: PHYSICIAN ASSISTANT

## 2023-12-05 PROCEDURE — 36415 COLL VENOUS BLD VENIPUNCTURE: CPT

## 2023-12-05 PROCEDURE — 82607 VITAMIN B-12: CPT | Performed by: PHYSICIAN ASSISTANT

## 2023-12-05 PROCEDURE — 99396 PREV VISIT EST AGE 40-64: CPT | Performed by: PHYSICIAN ASSISTANT

## 2023-12-05 RX ORDER — PANTOPRAZOLE SODIUM 40 MG/1
40 TABLET, DELAYED RELEASE ORAL DAILY
COMMUNITY
Start: 2023-10-05 | End: 2023-12-05

## 2023-12-05 RX ORDER — CLINDAMYCIN PHOSPHATE 11.9 MG/ML
SOLUTION TOPICAL
COMMUNITY
Start: 2023-12-04

## 2023-12-05 RX ORDER — KETOCONAZOLE 20 MG/G
1 CREAM TOPICAL 2 TIMES DAILY
COMMUNITY
Start: 2023-12-04

## 2023-12-05 RX ORDER — DOXYCYCLINE HYCLATE 100 MG/1
100 CAPSULE ORAL
COMMUNITY
Start: 2023-12-04

## 2023-12-05 RX ORDER — EPINEPHRINE 0.3 MG/.3ML
0.3 INJECTION SUBCUTANEOUS ONCE
COMMUNITY
Start: 2023-11-02

## 2023-12-05 NOTE — PROGRESS NOTES
"Jaz Camarillo III is a 43 y.o. male.     Chief Complaint   Patient presents with    Annual Exam       /92   Pulse 102   Resp 16   Ht 175.3 cm (69\")   Wt 87.9 kg (193 lb 12.8 oz)   SpO2 94%   BMI 28.62 kg/m²     BP Readings from Last 3 Encounters:   12/05/23 132/92   10/03/23 119/82   08/10/23 106/68       Wt Readings from Last 3 Encounters:   12/05/23 87.9 kg (193 lb 12.8 oz)   10/03/23 81.1 kg (178 lb 12.8 oz)   08/10/23 79.9 kg (176 lb 3.2 oz)       HPI Presents to the clinic for annual physical exam. He has been having issues with hiatal hernia and with gerd. He had scope recently and was found to have hiatal hernia and barretts. He was placed on protonix. He started this-still has a lot of reflux. Would like to have further workup regarding the hiatal hernia. Has chronic issues with ears and allergy symptoms. Has gained a bit of weight. Recently engaged. No chest pain, soa.     The following portions of the patient's history were reviewed and updated as appropriate: allergies, current medications, past family history, past medical history, past social history, past surgical history, and problem list.    Review of Systems    Objective   Physical Exam  Constitutional:       Appearance: He is well-developed.   HENT:      Head: Normocephalic and atraumatic.   Eyes:      Conjunctiva/sclera: Conjunctivae normal.      Pupils: Pupils are equal, round, and reactive to light.   Cardiovascular:      Rate and Rhythm: Normal rate and regular rhythm.      Heart sounds: No murmur heard.  Pulmonary:      Effort: Pulmonary effort is normal.      Breath sounds: Normal breath sounds.   Abdominal:      General: Bowel sounds are normal.      Palpations: Abdomen is soft.      Tenderness: There is no abdominal tenderness.   Musculoskeletal:         General: No deformity. Normal range of motion.      Cervical back: Normal range of motion and neck supple.   Lymphadenopathy:      Cervical: No cervical adenopathy. "   Skin:     General: Skin is warm and dry.      Capillary Refill: Capillary refill takes less than 2 seconds.      Findings: No rash.   Neurological:      Mental Status: He is alert and oriented to person, place, and time.      Cranial Nerves: No cranial nerve deficit.   Psychiatric:         Behavior: Behavior normal.         Thought Content: Thought content normal.         Judgment: Judgment normal.           Diagnoses and all orders for this visit:    1. Annual physical exam (Primary)  -     CBC w AUTO Differential; Future  -     Comprehensive metabolic panel; Future  -     Lipid panel; Future  -     TSH Rfx On Abnormal To Free T4; Future  -     Vitamin B12; Future  -     Vitamin D 25 hydroxy; Future    2. Hiatal hernia  -     Ambulatory Referral to Gastroenterology    3. Need for hepatitis C screening test    4. Orozco's esophagus without dysplasia  -     Ambulatory Referral to Gastroenterology    We will have him follow back up with gi for hiatal hernia and reflux. Could consider procedure with GI and gen surgery for hiatal hernia with barretts. Discussed this. We will do labs today. Screenings utd. Healthy eating habits. Low saturated fats. High plant based. Avoid processed foods. 15-30 min of cardiovascular exercise 5 days per week with atleast 2-3 days of resistance training.       Return in about 6 months (around 6/5/2024), or if symptoms worsen or fail to improve, for Recheck.

## 2023-12-06 LAB
25(OH)D3 SERPL-MCNC: 24.7 NG/ML (ref 30–100)
ALBUMIN SERPL-MCNC: 4.4 G/DL (ref 3.5–5.2)
ALBUMIN/GLOB SERPL: 1.6 G/DL
ALP SERPL-CCNC: 75 U/L (ref 39–117)
ALT SERPL W P-5'-P-CCNC: 25 U/L (ref 1–41)
ANION GAP SERPL CALCULATED.3IONS-SCNC: 10 MMOL/L (ref 5–15)
AST SERPL-CCNC: 16 U/L (ref 1–40)
BASOPHILS # BLD AUTO: 0.08 10*3/MM3 (ref 0–0.2)
BASOPHILS NFR BLD AUTO: 0.8 % (ref 0–1.5)
BILIRUB SERPL-MCNC: 0.2 MG/DL (ref 0–1.2)
BUN SERPL-MCNC: 13 MG/DL (ref 6–20)
BUN/CREAT SERPL: 11 (ref 7–25)
CALCIUM SPEC-SCNC: 9.5 MG/DL (ref 8.6–10.5)
CHLORIDE SERPL-SCNC: 102 MMOL/L (ref 98–107)
CHOLEST SERPL-MCNC: 258 MG/DL (ref 0–200)
CO2 SERPL-SCNC: 29 MMOL/L (ref 22–29)
CREAT SERPL-MCNC: 1.18 MG/DL (ref 0.76–1.27)
DEPRECATED RDW RBC AUTO: 43 FL (ref 37–54)
EGFRCR SERPLBLD CKD-EPI 2021: 79 ML/MIN/1.73
EOSINOPHIL # BLD AUTO: 0.27 10*3/MM3 (ref 0–0.4)
EOSINOPHIL NFR BLD AUTO: 2.8 % (ref 0.3–6.2)
ERYTHROCYTE [DISTWIDTH] IN BLOOD BY AUTOMATED COUNT: 13.6 % (ref 12.3–15.4)
GLOBULIN UR ELPH-MCNC: 2.7 GM/DL
GLUCOSE SERPL-MCNC: 74 MG/DL (ref 65–99)
HCT VFR BLD AUTO: 48.4 % (ref 37.5–51)
HDLC SERPL-MCNC: 29 MG/DL (ref 40–60)
HGB BLD-MCNC: 16.7 G/DL (ref 13–17.7)
IMM GRANULOCYTES # BLD AUTO: 0.04 10*3/MM3 (ref 0–0.05)
IMM GRANULOCYTES NFR BLD AUTO: 0.4 % (ref 0–0.5)
LDLC SERPL CALC-MCNC: 174 MG/DL (ref 0–100)
LDLC/HDLC SERPL: 5.92 {RATIO}
LYMPHOCYTES # BLD AUTO: 3.29 10*3/MM3 (ref 0.7–3.1)
LYMPHOCYTES NFR BLD AUTO: 33.8 % (ref 19.6–45.3)
MCH RBC QN AUTO: 30.2 PG (ref 26.6–33)
MCHC RBC AUTO-ENTMCNC: 34.5 G/DL (ref 31.5–35.7)
MCV RBC AUTO: 87.5 FL (ref 79–97)
MONOCYTES # BLD AUTO: 0.88 10*3/MM3 (ref 0.1–0.9)
MONOCYTES NFR BLD AUTO: 9 % (ref 5–12)
NEUTROPHILS NFR BLD AUTO: 5.17 10*3/MM3 (ref 1.7–7)
NEUTROPHILS NFR BLD AUTO: 53.2 % (ref 42.7–76)
NRBC BLD AUTO-RTO: 0 /100 WBC (ref 0–0.2)
PLATELET # BLD AUTO: 310 10*3/MM3 (ref 140–450)
PMV BLD AUTO: 9.6 FL (ref 6–12)
POTASSIUM SERPL-SCNC: 3.7 MMOL/L (ref 3.5–5.2)
PROT SERPL-MCNC: 7.1 G/DL (ref 6–8.5)
RBC # BLD AUTO: 5.53 10*6/MM3 (ref 4.14–5.8)
SODIUM SERPL-SCNC: 141 MMOL/L (ref 136–145)
TRIGL SERPL-MCNC: 287 MG/DL (ref 0–150)
VIT B12 BLD-MCNC: 378 PG/ML (ref 211–946)
VLDLC SERPL-MCNC: 55 MG/DL (ref 5–40)
WBC NRBC COR # BLD AUTO: 9.73 10*3/MM3 (ref 3.4–10.8)

## 2023-12-07 ENCOUNTER — TELEPHONE (OUTPATIENT)
Dept: FAMILY MEDICINE CLINIC | Facility: CLINIC | Age: 43
End: 2023-12-07
Payer: COMMERCIAL

## 2023-12-07 NOTE — TELEPHONE ENCOUNTER
HUB to share. LM to return call.  ----- Message from Eamon German PA-C sent at 12/7/2023  8:13 AM EST -----  Cholesterol is pretty high and vit d is low. We are going to avoid meds for cholesterol right now and want him to work on diet and exercise. Can take 2000 units vit d3 otc daily for d def.

## 2023-12-07 NOTE — TELEPHONE ENCOUNTER
"    Name: John Camarillo III \"Gregoria\"    Relationship: Self    Best Callback Number: [phone]     HUB PROVIDED THE RELAY MESSAGE FROM THE OFFICE   PATIENT VOICED UNDERSTANDING AND HAS NO FURTHER QUESTIONS AT THIS TIME    ADDITIONAL INFORMATION: NONE    "

## 2024-01-04 ENCOUNTER — OFFICE (AMBULATORY)
Dept: URBAN - METROPOLITAN AREA CLINIC 64 | Facility: CLINIC | Age: 44
End: 2024-01-04

## 2024-01-04 VITALS
SYSTOLIC BLOOD PRESSURE: 125 MMHG | WEIGHT: 192 LBS | DIASTOLIC BLOOD PRESSURE: 88 MMHG | HEIGHT: 69 IN | HEART RATE: 97 BPM

## 2024-01-04 DIAGNOSIS — F17.200 NICOTINE DEPENDENCE, UNSPECIFIED, UNCOMPLICATED: ICD-10-CM

## 2024-01-04 DIAGNOSIS — K22.70 BARRETT'S ESOPHAGUS WITHOUT DYSPLASIA: ICD-10-CM

## 2024-01-04 PROCEDURE — 99214 OFFICE O/P EST MOD 30 MIN: CPT | Performed by: INTERNAL MEDICINE

## 2024-01-09 PROBLEM — K22.70 BARRETT'S ESOPHAGUS WITHOUT DYSPLASIA: Status: ACTIVE | Noted: 2023-11-08

## 2024-03-04 RX ORDER — ESOMEPRAZOLE MAGNESIUM 40 MG/1
40 CAPSULE, DELAYED RELEASE ORAL
Status: ON HOLD | COMMUNITY
End: 2024-03-08

## 2024-03-07 PROBLEM — K22.70 BARRETT'S ESOPHAGUS WITHOUT DYSPLASIA: Status: ACTIVE | Noted: 2024-03-07

## 2024-03-08 ENCOUNTER — HOSPITAL ENCOUNTER (OUTPATIENT)
Facility: HOSPITAL | Age: 44
Setting detail: HOSPITAL OUTPATIENT SURGERY
Discharge: HOME | End: 2024-03-08
Attending: INTERNAL MEDICINE | Admitting: INTERNAL MEDICINE
Payer: COMMERCIAL

## 2024-03-08 ENCOUNTER — ANESTHESIA EVENT (OUTPATIENT)
Dept: GASTROENTEROLOGY | Facility: HOSPITAL | Age: 44
End: 2024-03-08
Payer: COMMERCIAL

## 2024-03-08 ENCOUNTER — ANESTHESIA (OUTPATIENT)
Dept: GASTROENTEROLOGY | Facility: HOSPITAL | Age: 44
End: 2024-03-08
Payer: COMMERCIAL

## 2024-03-08 ENCOUNTER — ON CAMPUS - OUTPATIENT (AMBULATORY)
Dept: URBAN - METROPOLITAN AREA HOSPITAL 85 | Facility: HOSPITAL | Age: 44
End: 2024-03-08

## 2024-03-08 VITALS
WEIGHT: 196.21 LBS | HEART RATE: 77 BPM | RESPIRATION RATE: 17 BRPM | BODY MASS INDEX: 29.06 KG/M2 | OXYGEN SATURATION: 98 % | SYSTOLIC BLOOD PRESSURE: 128 MMHG | DIASTOLIC BLOOD PRESSURE: 88 MMHG | HEIGHT: 69 IN | TEMPERATURE: 97.6 F

## 2024-03-08 DIAGNOSIS — K44.9 DIAPHRAGMATIC HERNIA WITHOUT OBSTRUCTION OR GANGRENE: ICD-10-CM

## 2024-03-08 DIAGNOSIS — K22.89 OTHER SPECIFIED DISEASE OF ESOPHAGUS: ICD-10-CM

## 2024-03-08 DIAGNOSIS — K22.2 ESOPHAGEAL OBSTRUCTION: ICD-10-CM

## 2024-03-08 DIAGNOSIS — K22.70 BARRETT'S ESOPHAGUS WITHOUT DYSPLASIA: ICD-10-CM

## 2024-03-08 PROCEDURE — 25810000003 SODIUM CHLORIDE 0.9 % SOLUTION: Performed by: NURSE ANESTHETIST, CERTIFIED REGISTERED

## 2024-03-08 PROCEDURE — 25010000002 PROPOFOL 200 MG/20ML EMULSION: Performed by: NURSE ANESTHETIST, CERTIFIED REGISTERED

## 2024-03-08 PROCEDURE — 25010000002 GLYCOPYRROLATE 0.2 MG/ML SOLUTION: Performed by: NURSE ANESTHETIST, CERTIFIED REGISTERED

## 2024-03-08 PROCEDURE — 25010000002 ACETYLCYSTEINE PER 100 MG: Performed by: INTERNAL MEDICINE

## 2024-03-08 PROCEDURE — C1733 CATH, EP, OTHR THAN COOL-TIP: HCPCS | Performed by: INTERNAL MEDICINE

## 2024-03-08 PROCEDURE — 43270 EGD LESION ABLATION: CPT | Performed by: INTERNAL MEDICINE

## 2024-03-08 PROCEDURE — C1769 GUIDE WIRE: HCPCS | Performed by: INTERNAL MEDICINE

## 2024-03-08 RX ORDER — GLYCOPYRROLATE 0.2 MG/ML
INJECTION INTRAMUSCULAR; INTRAVENOUS AS NEEDED
Status: DISCONTINUED | OUTPATIENT
Start: 2024-03-08 | End: 2024-03-08 | Stop reason: SURG

## 2024-03-08 RX ORDER — SODIUM CHLORIDE 9 MG/ML
INJECTION, SOLUTION INTRAVENOUS CONTINUOUS PRN
Status: DISCONTINUED | OUTPATIENT
Start: 2024-03-08 | End: 2024-03-08 | Stop reason: SURG

## 2024-03-08 RX ORDER — ESOMEPRAZOLE MAGNESIUM 40 MG/1
40 CAPSULE, DELAYED RELEASE ORAL
Qty: 180 CAPSULE | Refills: 3 | Status: SHIPPED | OUTPATIENT
Start: 2024-03-08

## 2024-03-08 RX ORDER — PROPOFOL 10 MG/ML
INJECTION, EMULSION INTRAVENOUS AS NEEDED
Status: DISCONTINUED | OUTPATIENT
Start: 2024-03-08 | End: 2024-03-08 | Stop reason: SURG

## 2024-03-08 RX ORDER — ONDANSETRON 2 MG/ML
4 INJECTION INTRAMUSCULAR; INTRAVENOUS ONCE AS NEEDED
Status: DISCONTINUED | OUTPATIENT
Start: 2024-03-08 | End: 2024-03-08 | Stop reason: HOSPADM

## 2024-03-08 RX ORDER — LIDOCAINE HYDROCHLORIDE 20 MG/ML
INJECTION, SOLUTION EPIDURAL; INFILTRATION; INTRACAUDAL; PERINEURAL AS NEEDED
Status: DISCONTINUED | OUTPATIENT
Start: 2024-03-08 | End: 2024-03-08 | Stop reason: SURG

## 2024-03-08 RX ORDER — ACETYLCYSTEINE 200 MG/ML
SOLUTION ORAL; RESPIRATORY (INHALATION)
Status: DISCONTINUED
Start: 2024-03-08 | End: 2024-03-08 | Stop reason: HOSPADM

## 2024-03-08 RX ADMIN — PROPOFOL 50 MG: 10 INJECTION, EMULSION INTRAVENOUS at 11:09

## 2024-03-08 RX ADMIN — PROPOFOL 50 MG: 10 INJECTION, EMULSION INTRAVENOUS at 11:19

## 2024-03-08 RX ADMIN — PROPOFOL 50 MG: 10 INJECTION, EMULSION INTRAVENOUS at 11:22

## 2024-03-08 RX ADMIN — PROPOFOL 50 MG: 10 INJECTION, EMULSION INTRAVENOUS at 11:21

## 2024-03-08 RX ADMIN — SODIUM CHLORIDE: 9 INJECTION, SOLUTION INTRAVENOUS at 11:01

## 2024-03-08 RX ADMIN — PROPOFOL 25 MG: 10 INJECTION, EMULSION INTRAVENOUS at 11:14

## 2024-03-08 RX ADMIN — PROPOFOL 50 MG: 10 INJECTION, EMULSION INTRAVENOUS at 11:17

## 2024-03-08 RX ADMIN — PROPOFOL 125 MG: 10 INJECTION, EMULSION INTRAVENOUS at 11:07

## 2024-03-08 RX ADMIN — PROPOFOL 50 MG: 10 INJECTION, EMULSION INTRAVENOUS at 11:12

## 2024-03-08 RX ADMIN — LIDOCAINE HYDROCHLORIDE 50 MG: 20 INJECTION, SOLUTION EPIDURAL; INFILTRATION; INTRACAUDAL; PERINEURAL at 11:06

## 2024-03-08 RX ADMIN — GLYCOPYRROLATE 0.2 MG: 0.2 INJECTION INTRAMUSCULAR; INTRAVENOUS at 11:07

## 2024-03-08 NOTE — DISCHARGE INSTRUCTIONS
A responsible adult should stay with you and you should rest quietly for the rest of the day.    Do not drink alcohol, drive, operate any heavy machinery or power tools or make any legal/important decisions for the next 24 hours.     Progress your diet as tolerated.  If you begin to experience severe pain, increased shortness of breath, racing heartbeat or a fever above 101 F, seek immediate medical attention.     Follow up with MD as instructed. Call office for results in 3 to 5 days if needed.     Dr Forrest [phone]  Examination of the esophagus:   A 4 cm segment of salmon-colored mucosa in distal esophagus extends from GE junction.  A benign-appearing stricture was seen just proximal to the Orozco's tissue and concentric rings were noted diffusely in the mid esophagus suggesting eosinophilic esophagitis     Mucosal regularity: None  Top of salmon colored mucosa: 32  Top of the gastric folds: 36  Diaphragmatic hiatus: 40  [place] classification C 3 M 4     Examination of the stomach: Normal  Examination of the duodenum: Normal     Description of radiofrequency ablation procedure:     The distal esophagus was irrigated with a solution of Mucomyst.  The 4 cm x 360 degree ablation balloon catheter was advanced over a guidewire to the distal esophagus under direct endoscopic visualization.  Orozco's mucosa was ablated in the usual fashion with 12 J/cm2.  The coagulum was removed and the treatment was performed a second time.  A total of 2 ablations were performed.  Patient tolerated procedure well.  As the inflated balloon was pulled out, there was some resistance and second look revealed effective dilation of the stricture        Impression:  EGD shows 4 cm segment of Orozco's esophagus without dysplasia, concentric rings suggesting eosinophilic esophagitis, distal esophageal stricture, and 4 cm hernia.  Radiofrequency ablation performed.  This is the first session     Recommendations:  Monitor for  postoperative palpitations  Continue twice daily proton pump inhibitor  Hydrocodone liquid x 3 days as needed  Start clear liquids today and advance as tolerated  Repeat EGD with RFA in 3 months

## 2024-03-08 NOTE — ANESTHESIA POSTPROCEDURE EVALUATION
Patient: John Andradeehn III    Procedure Summary       Date: 03/08/24 Room / Location: [facility] ENDOSCOPY 1 / [facility] ENDOSCOPY    Anesthesia Start: 1101 Anesthesia Stop: 1127    Procedure: ESOPHAGOGASTRODUODENOSCOPY AND RADIOFREQUENCY ABLATION X2 Diagnosis:       Orozco's esophagus without dysplasia      (Orozco's esophagus without dysplasia [K22.70])    Surgeons: Mike Forrest MD Provider: Donny Paulino MD    Anesthesia Type: general ASA Status: 2            Anesthesia Type: general    Vitals  Vitals Value Taken Time   /91 03/08/24 1230   Temp     Pulse 69 03/08/24 1235   Resp     SpO2 99 % 03/08/24 1234   Vitals shown include unfiled device data.        Post Anesthesia Care and Evaluation    Patient location during evaluation: PACU  Patient participation: complete - patient participated  Level of consciousness: awake  Pain score: 0  Pain management: adequate  Anesthetic complications: No anesthetic complications  PONV Status: none  Cardiovascular status: acceptable  Respiratory status: acceptable  Hydration status: acceptable

## 2024-03-08 NOTE — LETTER
March 8, 2024      [facility] ENDO [facility]IT[facility]  [facility] [address] [facility] [place] [address] [phone]          Patient: John Camarillo III   YOB: 1980   Date of Visit: 03/08/2024       To Whom It May Concern:    John Camarillo III was seen at [facility] ENDO [facility] on 03/08/2024    Please excuse Precious Camarillo from work today 03/08/2024.        Sincerely,       Dr. Forrest/ Danielle SHARMA  [facility]

## 2024-03-08 NOTE — LETTER
March 8, 2024     Patient: John Camarillo III   YOB: 1980   Date of Visit: 03/08/2024       To Whom It May Concern:    It is my medical opinion that John Camarillo should remain out of work until 03/10/2024 .           Sincerely,        Dr Forrest/ Danielle SHARMA

## 2024-03-08 NOTE — OP NOTE
ESOPHAGOGASTRODUODENOSCOPY AND BARRX PROCEDURE Procedure Report    Patient Name:  John Andradeehn III  YOB: 1980    Date of Surgery:  3/8/2024     Preoperative diagnosis:  Orozco's esophagus without dysplasia    Postoperative diagnosis:  Orozco's esophagus without dysplasia  Distal esophageal stricture  Concentric rings suggesting eosinophilic esophagitis  4 cm hiatal hernia         Procedure(s):  ESOPHAGOGASTRODUODENOSCOPY AND RADIOFREQUENCY ABLATION X2     Staff:  Surgeon(s):  Mike Forrest MD      Anesthesia: Monitored Anesthesia Care    Implants:    Nothing was implanted during the procedure    Specimen:        See Below    Estimated blood loss: Minimal     Complications:  None    Description of Procedure:  Informed consent was obtained for the procedure, including sedation.  Risks of perforation, hemorrhage, adverse drug reaction and aspiration were discussed.  The patient was brought into the endoscopy suite. Continuous cardiopulmonary monitoring was performed. The patient was placed in the left lateral decubitus position.  The bite block was inserted into the patient's mouth. After adequate sedation was attained, the Olympus gastroscope was inserted into the patient's mouth and advanced to the second portion of the duodenum without difficulty.  Circumferential examination was performed. A retroflex exam was performed in the patient's stomach.  On completion of the exam, the bowel was decompressed, the scope was removed from the patient, the patient tolerated the procedure well, there were no immediate post-operative complications.     Examination of the esophagus:   A 4 cm segment of salmon-colored mucosa in distal esophagus extends from GE junction.  A benign-appearing stricture was seen just proximal to the Orozco's tissue and concentric rings were noted diffusely in the mid esophagus suggesting eosinophilic esophagitis    Mucosal regularity: None  Top of salmon colored mucosa:  32  Top of the gastric folds: 36  Diaphragmatic hiatus: 40  [place] classification C 3 M 4    Examination of the stomach: Normal  Examination of the duodenum: Normal    Description of radiofrequency ablation procedure:    The distal esophagus was irrigated with a solution of Mucomyst.  The 4 cm x 360 degree ablation balloon catheter was advanced over a guidewire to the distal esophagus under direct endoscopic visualization.  Orozco's mucosa was ablated in the usual fashion with 12 J/cm2.  The coagulum was removed and the treatment was performed a second time.  A total of 2 ablations were performed.  Patient tolerated procedure well.  As the inflated balloon was pulled out, there was some resistance and second look revealed effective dilation of the stricture      Impression:  EGD shows 4 cm segment of Orozco's esophagus without dysplasia, concentric rings suggesting eosinophilic esophagitis, distal esophageal stricture, and 4 cm hernia.  Radiofrequency ablation performed.  This is the first session    Recommendations:  Monitor for postoperative palpitations  Continue twice daily proton pump inhibitor  Hydrocodone liquid x 3 days as needed  Start clear liquids today and advance as tolerated  Repeat EGD with RFA in 3 months    We appreciate the referral    Electronically signed by Mike Forrest MD, 03/08/24, 11:30 AM EST.

## 2024-03-08 NOTE — H&P
GI CONSULT  NOTE:    Referring Provider:    Eamon German PA-C Matthew David McColloug*    Chief complaint: Orozco's esophagus without dysplasia    History of present illness:      John Camarillo III is a 43 y.o. male who presents today for Procedure(s):  ESOPHAGOGASTRODUODENOSCOPY AND BARRX PROCEDURE for the indications listed below.     The updated Patient Profile was reviewed prior to the procedure, in conjunction with the Physical Exam, including medical conditions, surgical procedures, medications, allergies, family history and social history.     Pre-operatively, I reviewed the indication(s) for the procedure, the risks of the procedure [including but not limited to: unexpected bleeding possibly requiring hospitalization and/or unplanned repeat procedures, perforation possibly requiring surgical treatment, missed lesions and complications of sedation/MAC (also explained by anesthesia staff)].     I have evaluated the patient for risks associated with the planned anesthesia and the procedure to be performed and find the patient an acceptable candidate for IV sedation.    Multiple opportunities were provided for any questions or concerns, and all questions were answered satisfactorily before any anesthesia was administered. We will proceed with the planned procedure.    Past Medical History:  Past Medical History:   Diagnosis Date    Allergic     Anxiety     Asthma     GERD (gastroesophageal reflux disease)     Hiatal hernia     Irritable bowel syndrome     Peptic ulceration     S/P surgery for recurrent dislocation of shoulder 1998    Smoker 1998       Past Surgical History:  Past Surgical History:   Procedure Laterality Date    CHOLECYSTECTOMY      COLONOSCOPY      EYE SURGERY      KNEE ACL RECONSTRUCTION Right 2015       Social History:  Social History     Tobacco Use    Smoking status: Every Day     Current packs/day: 0.50     Average packs/day: 0.5 packs/day for 26.2 years (13.1 ttl pk-yrs)     Types:  "Cigarettes     Start date: 1998    Smokeless tobacco: Never   Vaping Use    Vaping status: Never Used    Passive vaping exposure: Yes   Substance Use Topics    Alcohol use: Yes     Alcohol/week: 5.0 standard drinks of alcohol     Types: 5 Cans of beer per week     Comment: raely    Drug use: Never       Family History:  Family History   Problem Relation Age of Onset    Hypertension Mother     Hypertension Father     Stomach cancer Maternal Grandmother     Lung cancer Maternal Grandmother     Lung cancer Maternal Grandfather        Medications:  No medications prior to admission.       Scheduled Meds:  Continuous Infusions:No current facility-administered medications for this encounter.    PRN Meds:.    ALLERGIES:  Patient has no known allergies.    ROS:  The following systems were reviewed and negative;   Constitution:  No fevers, chills, no unintentional weight loss  Skin: no rash, no jaundice  Eyes:  No blurry vision, no eye pain  HENT:  No change in hearing or smell  Resp:  No dyspnea or cough  CV:  No chest pain or palpitations  :  No dysuria, hematuria  Musculoskeletal:  No leg cramps or arthralgias  Neuro:  No tremor, no numbness  Psych:  No depression or confusion    Objective     Vital Signs:   Vitals:    03/04/24 1220   Weight: 83.9 kg (185 lb)   Height: 175.3 cm (69\")       Physical Exam:       General Appearance:    Awake and alert, in no acute distress   Head:    Normocephalic, without obvious abnormality, atraumatic   Throat:   No oral lesions, no thrush, oral mucosa moist   Lungs:     respirations regular, even and unlabored   Skin:   No rash, no jaundice       Results Review:  Lab Results (last 24 hours)       ** No results found for the last 24 hours. **            Imaging Results (Last 24 Hours)       ** No results found for the last 24 hours. **             I reviewed the patient's labs and imaging.    ASSESSMENT AND PLAN:      Principal Problem:    Orozco's esophagus without dysplasia   "     Procedure(s):  ESOPHAGOGASTRODUODENOSCOPY AND BARRX PROCEDURE      I discussed the patients findings and my recommendations with the patient.    Electronically signed by Mike Forrest MD, 03/07/24, 7:47 PM EST.

## 2024-03-08 NOTE — ANESTHESIA PREPROCEDURE EVALUATION
Anesthesia Evaluation     Patient summary reviewed and Nursing notes reviewed   NPO Solid Status: > 8 hours             Airway   Mallampati: II  TM distance: >3 FB  Neck ROM: full  No difficulty expected  Dental - normal exam     Pulmonary - normal exam   (+) a smoker Current, asthma,  (-) sleep apnea  Cardiovascular - negative cardio ROS and normal exam    (-) angina, BRANDT      Neuro/Psych- negative ROS  GI/Hepatic/Renal/Endo    (+) hiatal hernia, GERD    ROS Comment: Orozco's      Musculoskeletal (-) negative ROS    Abdominal  - normal exam    Bowel sounds: normal.   Substance History - negative use     OB/GYN negative ob/gyn ROS         Other                    Anesthesia Plan    ASA 2     general       Anesthetic plan, risks, benefits, and alternatives have been provided, discussed and informed consent has been obtained with: patient.    CODE STATUS:

## 2024-03-13 ENCOUNTER — OFFICE VISIT (OUTPATIENT)
Dept: FAMILY MEDICINE CLINIC | Facility: CLINIC | Age: 44
End: 2024-03-13
Payer: COMMERCIAL

## 2024-03-13 VITALS
OXYGEN SATURATION: 97 % | HEART RATE: 95 BPM | BODY MASS INDEX: 28.17 KG/M2 | HEIGHT: 69 IN | DIASTOLIC BLOOD PRESSURE: 80 MMHG | SYSTOLIC BLOOD PRESSURE: 118 MMHG | RESPIRATION RATE: 16 BRPM | WEIGHT: 190.2 LBS

## 2024-03-13 DIAGNOSIS — R06.09 DYSPNEA ON EXERTION: Primary | ICD-10-CM

## 2024-03-13 DIAGNOSIS — E78.2 MODERATE MIXED HYPERLIPIDEMIA NOT REQUIRING STATIN THERAPY: ICD-10-CM

## 2024-03-13 DIAGNOSIS — K22.70 BARRETT'S ESOPHAGUS WITHOUT DYSPLASIA: ICD-10-CM

## 2024-03-13 DIAGNOSIS — M19.90 ARTHRITIS: ICD-10-CM

## 2024-03-13 PROBLEM — Z20.2 POSSIBLE EXPOSURE TO STD: Status: RESOLVED | Noted: 2022-10-06 | Resolved: 2024-03-13

## 2024-03-13 LAB
ALBUMIN SERPL-MCNC: 4.3 G/DL (ref 3.5–5.2)
ALBUMIN/GLOB SERPL: 1.8 G/DL
ALP SERPL-CCNC: 90 U/L (ref 39–117)
ALT SERPL W P-5'-P-CCNC: 24 U/L (ref 1–41)
ANION GAP SERPL CALCULATED.3IONS-SCNC: 10.5 MMOL/L (ref 5–15)
AST SERPL-CCNC: 21 U/L (ref 1–40)
BILIRUB SERPL-MCNC: 0.3 MG/DL (ref 0–1.2)
BUN SERPL-MCNC: 12 MG/DL (ref 6–20)
BUN/CREAT SERPL: 11.8 (ref 7–25)
CALCIUM SPEC-SCNC: 9.1 MG/DL (ref 8.6–10.5)
CHLORIDE SERPL-SCNC: 102 MMOL/L (ref 98–107)
CHOLEST SERPL-MCNC: 234 MG/DL (ref 0–200)
CHROMATIN AB SERPL-ACNC: <10 IU/ML (ref 0–14)
CO2 SERPL-SCNC: 26.5 MMOL/L (ref 22–29)
CREAT SERPL-MCNC: 1.02 MG/DL (ref 0.76–1.27)
CRP SERPL-MCNC: 0.55 MG/DL (ref 0–0.5)
EGFRCR SERPLBLD CKD-EPI 2021: 94.1 ML/MIN/1.73
ERYTHROCYTE [SEDIMENTATION RATE] IN BLOOD: 2 MM/HR (ref 0–15)
GLOBULIN UR ELPH-MCNC: 2.4 GM/DL
GLUCOSE SERPL-MCNC: 75 MG/DL (ref 65–99)
HDLC SERPL-MCNC: 29 MG/DL (ref 40–60)
LDLC SERPL CALC-MCNC: 193 MG/DL (ref 0–100)
LDLC/HDLC SERPL: 6.58 {RATIO}
POTASSIUM SERPL-SCNC: 4.4 MMOL/L (ref 3.5–5.2)
PROT SERPL-MCNC: 6.7 G/DL (ref 6–8.5)
SODIUM SERPL-SCNC: 139 MMOL/L (ref 136–145)
TRIGL SERPL-MCNC: 71 MG/DL (ref 0–150)
VLDLC SERPL-MCNC: 12 MG/DL (ref 5–40)

## 2024-03-13 PROCEDURE — 85652 RBC SED RATE AUTOMATED: CPT | Performed by: STUDENT IN AN ORGANIZED HEALTH CARE EDUCATION/TRAINING PROGRAM

## 2024-03-13 PROCEDURE — 80053 COMPREHEN METABOLIC PANEL: CPT | Performed by: STUDENT IN AN ORGANIZED HEALTH CARE EDUCATION/TRAINING PROGRAM

## 2024-03-13 PROCEDURE — 80061 LIPID PANEL: CPT | Performed by: STUDENT IN AN ORGANIZED HEALTH CARE EDUCATION/TRAINING PROGRAM

## 2024-03-13 PROCEDURE — 36415 COLL VENOUS BLD VENIPUNCTURE: CPT | Performed by: STUDENT IN AN ORGANIZED HEALTH CARE EDUCATION/TRAINING PROGRAM

## 2024-03-13 PROCEDURE — 86140 C-REACTIVE PROTEIN: CPT | Performed by: STUDENT IN AN ORGANIZED HEALTH CARE EDUCATION/TRAINING PROGRAM

## 2024-03-13 PROCEDURE — 86431 RHEUMATOID FACTOR QUANT: CPT | Performed by: STUDENT IN AN ORGANIZED HEALTH CARE EDUCATION/TRAINING PROGRAM

## 2024-03-13 RX ORDER — OMEPRAZOLE 40 MG/1
1 CAPSULE, DELAYED RELEASE ORAL EVERY 12 HOURS SCHEDULED
COMMUNITY
Start: 2024-02-09 | End: 2024-03-13

## 2024-03-13 NOTE — PROGRESS NOTES
"Chief Complaint  Extremity Weakness, Spasms, Dizziness, Chest Pain, and Cough    Subjective        John Camarillo III presents to [facility]  History of Present Illness  Gregoria is a 43-year-old with hyperlipidemia, anxiety, tobacco use disorder who presents today for multiple issues.  One of them is that he has had extremity weakness and dizziness with chest pain.  Stated that this happened multiple times.  He has been feeling some tinges of pain in his chest that have come which is rest.  States that he is also had difficulty with working on the treadmill.  He states he cannot get his heart rate above 140 without feeling discomfort.  He states that he has having difficulty with losing weight.  His cholesterol has been elevated to 170s LDL, HDL and 29.  He is worried about his heart.  He does smoke about 1 pack a day.  He is got also random muscle spasm.  He was working at trains all the other day and had really bad pain in his bilateral arms.  He has also had some middle finger pain including the distal tendon.  Denies any knuckle swelling or redness or erythema.  States that he also had an instant headache when he was laying floor the other day and felt sharp pains throughout.  He said he is never felt that before.  Denies any nausea, vomiting, diarrhea.  Does get swelling in his legs and feet on the regular.        Objective   Vital Signs:  /80   Pulse 95   Resp 16   Ht 175.3 cm (69\")   Wt 86.3 kg (190 lb 3.2 oz)   SpO2 97%   BMI 28.09 kg/m²   Estimated body mass index is 28.09 kg/m² as calculated from the following:    Height as of this encounter: 175.3 cm (69\").    Weight as of this encounter: 86.3 kg (190 lb 3.2 oz).               Physical Exam  Vitals and nursing note reviewed.   Constitutional:       General: He is not in acute distress.     Appearance: Normal appearance. He is not toxic-appearing.   HENT:      Head: Normocephalic and atraumatic.      Right Ear: " Tympanic membrane, ear canal and external ear normal.      Left Ear: Tympanic membrane, ear canal and external ear normal.      Nose: Nose normal. No congestion or rhinorrhea.      Mouth/Throat:      Mouth: Mucous membranes are moist.      Pharynx: No oropharyngeal exudate.   Eyes:      General: No scleral icterus.        Right eye: No discharge.         Left eye: No discharge.      Extraocular Movements: Extraocular movements intact.      Conjunctiva/sclera: Conjunctivae normal.      Pupils: Pupils are equal, round, and reactive to light.   Cardiovascular:      Rate and Rhythm: Normal rate and regular rhythm.      Pulses: Normal pulses.      Heart sounds: Normal heart sounds. No murmur heard.  Pulmonary:      Effort: Pulmonary effort is normal. No respiratory distress.      Breath sounds: Normal breath sounds. No wheezing.   Abdominal:      General: Abdomen is flat. Bowel sounds are normal. There is no distension.      Palpations: Abdomen is soft.      Tenderness: There is no abdominal tenderness. There is no guarding.   Musculoskeletal:         General: No swelling, tenderness or deformity. Normal range of motion.      Cervical back: Normal range of motion and neck supple. No rigidity or tenderness.   Lymphadenopathy:      Cervical: No cervical adenopathy.   Skin:     General: Skin is warm.      Capillary Refill: Capillary refill takes less than 2 seconds.   Neurological:      General: No focal deficit present.      Mental Status: He is alert and oriented to person, place, and time. Mental status is at baseline.      Cranial Nerves: No cranial nerve deficit.      Gait: Gait normal.   Psychiatric:         Mood and Affect: Mood normal.         Behavior: Behavior normal.         Thought Content: Thought content normal.         Judgment: Judgment normal.        Result Review :    The following data was reviewed by: Rex Ramirez MD on 03/13/2024:  Common labs          12/5/2023    15:42   Common Labs   Glucose 74     BUN 13    Creatinine 1.18    Sodium 141    Potassium 3.7    Chloride 102    Calcium 9.5    Albumin 4.4    Total Bilirubin 0.2    Alkaline Phosphatase 75    AST (SGOT) 16    ALT (SGPT) 25    WBC 9.73    Hemoglobin 16.7    Hematocrit 48.4    Platelets 310    Total Cholesterol 258    Triglycerides 287    HDL Cholesterol 29    LDL Cholesterol  174      Data reviewed : Previous notes             Assessment and Plan     Diagnoses and all orders for this visit:    1. Dyspnea on exertion (Primary)  -     Treadmill Stress Test; Future  -     Adult Transthoracic Echo Complete W/ Cont if Necessary Per Protocol; Future  -     Holter Monitor - 72 Hour Up To 15 Days; Future  -     Comprehensive metabolic panel  -     Lipid panel    2. Orozco's esophagus without dysplasia    3. Arthritis  -     Rheumatoid Factor, Quant  -     C-reactive protein  -     Sedimentation rate, automated    4. Moderate mixed hyperlipidemia not requiring statin therapy    For his dyspnea, will do some different things.  Will do a stress test, echo, Holter monitor.  I do worry about his swelling in his legs and feet.  He did have elevated cholesterol at his last visit.  I do also worry that the fact that he cannot get his heart rate up without feeling issues.  Depending on these results we may need to do further workup such as a CT chest versus cath versus other.    Arthritis  Gets random arthritis in his bilateral middle fingers and elbows.  States that his mother was recently diagnosed with rheumatoid arthritis.  Will to get a rheumatoid factor and C-reactive protein and sed rate.  Discussed using finger slings for this issue.         Follow Up     Return if symptoms worsen or fail to improve.  Patient was given instructions and counseling regarding his condition or for health maintenance advice. Please see specific information pulled into the AVS if appropriate.

## 2024-04-02 ENCOUNTER — OFFICE VISIT (OUTPATIENT)
Dept: FAMILY MEDICINE CLINIC | Facility: CLINIC | Age: 44
End: 2024-04-02
Payer: COMMERCIAL

## 2024-04-02 VITALS
DIASTOLIC BLOOD PRESSURE: 88 MMHG | HEIGHT: 69 IN | BODY MASS INDEX: 27.91 KG/M2 | OXYGEN SATURATION: 96 % | WEIGHT: 188.4 LBS | RESPIRATION RATE: 16 BRPM | SYSTOLIC BLOOD PRESSURE: 136 MMHG | HEART RATE: 94 BPM

## 2024-04-02 DIAGNOSIS — K59.00 CONSTIPATION, UNSPECIFIED CONSTIPATION TYPE: Primary | ICD-10-CM

## 2024-04-02 PROCEDURE — 99213 OFFICE O/P EST LOW 20 MIN: CPT | Performed by: INTERNAL MEDICINE

## 2024-04-02 NOTE — PROGRESS NOTES
Chief Complaint  Constipation    HPI:    John Camarillo III presents to [facility]    Patient is a 43-year-old male with eustachian tube dysfunction, Orozco's esophagus, anxiety, tobacco dependence presenting for evaluation of constipation.    Patient reports that last week he ate a big bowel of strawberries and blueberries and has not had a bowel movement since. About 3-4 days ago he took some laxative and subsequently developed cramping abdominal pain. He took the max dose on the first day and took one tablet the last two days. He did have a small bowel movement earlier today but was mostly lose. He generally has a bowel movement daily but has not had constipation like this usually. Nothing really makes symptoms better or worse. Denies hematochezia, melena, jaundice. Denies fever, chills, nausea, vomiting. Denies dysuria, urgency, frequency, hematuria, or flank pain. Denies new medications or changes in medication. Denies new sick contacts, food/water exposures, travel, or antibiotics. Colonoscopy 4/2022 with mild diverticulosis and intrnal hemorrhoids. Denies personal or family history of  inflammatory bowel disease, or colon cancer. He reports that he may have had diagnosis of IBS. Previous cholecystectomy. Tolerating oral intake and staying well hydrated.       Review of Systems:  ROS negative unless otherwise noted in HPI above.    Past Medical History:   Diagnosis Date    Allergic     Anxiety     Asthma     GERD (gastroesophageal reflux disease)     Hiatal hernia     Irritable bowel syndrome     Peptic ulceration     S/P surgery for recurrent dislocation of shoulder 1998    Smoker 1998         Current Outpatient Medications:     EPINEPHrine (EPIPEN) 0.3 MG/0.3ML solution auto-injector injection, Inject 0.3 mL into the appropriate muscle as directed by prescriber 1 (One) Time., Disp: , Rfl:     esomeprazole (nexIUM) 40 MG capsule, Take 1 capsule by mouth 2 (Two) Times a Day  "Before Meals., Disp: 180 capsule, Rfl: 3    loratadine-pseudoephedrine (Claritin-D 12 Hour) 5-120 MG per 12 hr tablet, Take 1 tablet by mouth 2 (Two) Times a Day., Disp: , Rfl:     VITAMIN D PO, Take  by mouth., Disp: , Rfl:     Wheat Dextrin (BENEFIBER PO), Take  by mouth., Disp: , Rfl:     Social History     Socioeconomic History    Marital status: Single   Tobacco Use    Smoking status: Every Day     Current packs/day: 0.50     Average packs/day: 0.5 packs/day for 26.3 years (13.1 ttl pk-yrs)     Types: Cigarettes     Start date: 1998    Smokeless tobacco: Never   Vaping Use    Vaping status: Never Used    Passive vaping exposure: Yes   Substance and Sexual Activity    Alcohol use: Yes     Alcohol/week: 5.0 standard drinks of alcohol     Types: 5 Cans of beer per week     Comment: raely    Drug use: Never    Sexual activity: Yes        Objective   Vital Signs:  /88   Pulse 94   Resp 16   Ht 175.3 cm (69\")   Wt 85.5 kg (188 lb 6.4 oz)   SpO2 96%   BMI 27.82 kg/m²   Estimated body mass index is 27.82 kg/m² as calculated from the following:    Height as of this encounter: 175.3 cm (69\").    Weight as of this encounter: 85.5 kg (188 lb 6.4 oz).    Physical Exam:  General: Well-appearing patient, no apparent distress  Cardiac: Regular rate and rhythm, normal S1/S2, no murmur, rubs or gallops, no lower extremity edema  Lungs: Clear to auscultation bilaterally, no crackles or wheezes  Abdomen: Soft, slightly distended and slightly tender to palpation in left lower quadrant, palpable stool., no guarding or rebound tenderness, no hepatosplenomegaly  Skin: No significant rashes or lesions  MSK: Grossly normal tone and strength  Neuro: Alert and oriented x3, CN II-XII grossly intact  Psych: Appropriate mood and affect    Assessment and Plan:    (K59.00) Constipation, unspecified constipation type  Assessment: Patient with approximately 6 to 7 days of worsening constipation.  Exam notable for abdominal distention " and slight tenderness to palpation in left lower quadrant pain.  No red flag symptoms.  Patient otherwise up-to-date with colonoscopy without any concerning findings.  Suspect ongoing constipation.  Lengthy discussion about treatment and escalation of bowel regiment.  Patient aware of signs and symptoms which should prompt reevaluation  Plan:  - Stay well hydrated  - Continue fiber  - Miralax 1 capful daily  -Senna 2 tablets twice a day  - Milk of magnesium needed  - Fleets enema as needed  - Hold on imaging for now  - Follow up if new/worsening symptoms    Patient was given instructions and counseling regarding his condition or for health maintenance advice. Please see specific information pulled into the AVS if appropriate.       Dr Sylvain Read   Internal Medicine Physician  [facility]  [address], [address]  [place] [address]

## 2024-04-02 NOTE — PATIENT INSTRUCTIONS
Stay well hydrated    Continue fiber    Miralax 1 capful daily    Senna 2 tablets twice a day    Milk of magnesium needed    Fleets enema as needed    Follow up if new/worsening symptoms

## 2024-04-18 ENCOUNTER — HOSPITAL ENCOUNTER (OUTPATIENT)
Dept: CARDIOLOGY | Facility: HOSPITAL | Age: 44
Discharge: HOME | End: 2024-04-18
Payer: COMMERCIAL

## 2024-04-18 ENCOUNTER — HOSPITAL ENCOUNTER (OUTPATIENT)
Dept: RESPIRATORY THERAPY | Facility: HOSPITAL | Age: 44
Discharge: HOME | End: 2024-04-18
Payer: COMMERCIAL

## 2024-04-18 VITALS
HEIGHT: 69 IN | SYSTOLIC BLOOD PRESSURE: 118 MMHG | BODY MASS INDEX: 27.85 KG/M2 | DIASTOLIC BLOOD PRESSURE: 76 MMHG | WEIGHT: 188 LBS

## 2024-04-18 DIAGNOSIS — R06.09 DYSPNEA ON EXERTION: ICD-10-CM

## 2024-04-18 PROCEDURE — 93246 EXT ECG>7D<15D RECORDING: CPT

## 2024-04-18 PROCEDURE — 93356 MYOCRD STRAIN IMG SPCKL TRCK: CPT

## 2024-04-18 PROCEDURE — 93017 CV STRESS TEST TRACING ONLY: CPT

## 2024-04-18 PROCEDURE — 93306 TTE W/DOPPLER COMPLETE: CPT

## 2024-04-19 ENCOUNTER — TELEPHONE (OUTPATIENT)
Dept: FAMILY MEDICINE CLINIC | Facility: CLINIC | Age: 44
End: 2024-04-19
Payer: COMMERCIAL

## 2024-04-19 LAB
AORTIC DIMENSIONLESS INDEX: 0.67 (DI)
BH CV ECHO LEFT VENTRICLE GLOBAL LONGITUDINAL STRAIN: -15.9 %
BH CV ECHO MEAS - ACS: 1.7 CM
BH CV ECHO MEAS - AO MAX PG: 6.7 MMHG
BH CV ECHO MEAS - AO MEAN PG: 4 MMHG
BH CV ECHO MEAS - AO V2 MAX: 129 CM/SEC
BH CV ECHO MEAS - AO V2 VTI: 24.6 CM
BH CV ECHO MEAS - AVA(I,D): 2.24 CM2
BH CV ECHO MEAS - EDV(CUBED): 79.5 ML
BH CV ECHO MEAS - EDV(MOD-SP2): 80.5 ML
BH CV ECHO MEAS - EDV(MOD-SP4): 93.8 ML
BH CV ECHO MEAS - EF(MOD-BP): 58.3 %
BH CV ECHO MEAS - EF(MOD-SP2): 58.6 %
BH CV ECHO MEAS - EF(MOD-SP4): 58 %
BH CV ECHO MEAS - ESV(CUBED): 24.4 ML
BH CV ECHO MEAS - ESV(MOD-SP2): 33.3 ML
BH CV ECHO MEAS - ESV(MOD-SP4): 39.4 ML
BH CV ECHO MEAS - FS: 32.6 %
BH CV ECHO MEAS - IVS/LVPW: 0.6 CM
BH CV ECHO MEAS - IVSD: 0.6 CM
BH CV ECHO MEAS - LA DIMENSION: 3.4 CM
BH CV ECHO MEAS - LAT PEAK E' VEL: 15.1 CM/SEC
BH CV ECHO MEAS - LV DIASTOLIC VOL/BSA (35-75): 46.6 CM2
BH CV ECHO MEAS - LV MASS(C)D: 105.3 GRAMS
BH CV ECHO MEAS - LV MAX PG: 3 MMHG
BH CV ECHO MEAS - LV MEAN PG: 2 MMHG
BH CV ECHO MEAS - LV SYSTOLIC VOL/BSA (12-30): 19.6 CM2
BH CV ECHO MEAS - LV V1 MAX: 86.6 CM/SEC
BH CV ECHO MEAS - LV V1 VTI: 14.5 CM
BH CV ECHO MEAS - LVIDD: 4.3 CM
BH CV ECHO MEAS - LVIDS: 2.9 CM
BH CV ECHO MEAS - LVOT AREA: 3.8 CM2
BH CV ECHO MEAS - LVOT DIAM: 2.2 CM
BH CV ECHO MEAS - LVPWD: 1 CM
BH CV ECHO MEAS - MED PEAK E' VEL: 9.7 CM/SEC
BH CV ECHO MEAS - MV A MAX VEL: 54.2 CM/SEC
BH CV ECHO MEAS - MV DEC SLOPE: 661 CM/SEC2
BH CV ECHO MEAS - MV DEC TIME: 0.16 SEC
BH CV ECHO MEAS - MV E MAX VEL: 81.7 CM/SEC
BH CV ECHO MEAS - MV E/A: 1.51
BH CV ECHO MEAS - MV MAX PG: 3.1 MMHG
BH CV ECHO MEAS - MV MEAN PG: 1 MMHG
BH CV ECHO MEAS - MV P1/2T: 39.2 MSEC
BH CV ECHO MEAS - MV V2 VTI: 20.2 CM
BH CV ECHO MEAS - MVA(P1/2T): 5.6 CM2
BH CV ECHO MEAS - MVA(VTI): 2.7 CM2
BH CV ECHO MEAS - PA ACC TIME: 0.1 SEC
BH CV ECHO MEAS - PA V2 MAX: 89.1 CM/SEC
BH CV ECHO MEAS - PULM A REVS DUR: 0.12 SEC
BH CV ECHO MEAS - PULM A REVS VEL: 20.1 CM/SEC
BH CV ECHO MEAS - PULM DIAS VEL: 37.8 CM/SEC
BH CV ECHO MEAS - PULM S/D: 1.15
BH CV ECHO MEAS - PULM SYS VEL: 43.5 CM/SEC
BH CV ECHO MEAS - RV MAX PG: 1.02 MMHG
BH CV ECHO MEAS - RV V1 MAX: 50.5 CM/SEC
BH CV ECHO MEAS - RV V1 VTI: 10.5 CM
BH CV ECHO MEAS - SI(MOD-SP2): 23.5 ML/M2
BH CV ECHO MEAS - SI(MOD-SP4): 27 ML/M2
BH CV ECHO MEAS - SV(LVOT): 55.1 ML
BH CV ECHO MEAS - SV(MOD-SP2): 47.2 ML
BH CV ECHO MEAS - SV(MOD-SP4): 54.4 ML
BH CV ECHO MEAS - TAPSE (>1.6): 1.97 CM
BH CV ECHO MEASUREMENTS AVERAGE E/E' RATIO: 6.59
BH CV STRESS BP STAGE 1: NORMAL
BH CV STRESS BP STAGE 2: NORMAL
BH CV STRESS BP STAGE 3: NORMAL
BH CV STRESS DURATION MIN STAGE 1: 3
BH CV STRESS DURATION MIN STAGE 2: 3
BH CV STRESS DURATION MIN STAGE 3: 3
BH CV STRESS DURATION MIN STAGE 4: 0
BH CV STRESS DURATION SEC STAGE 1: 0
BH CV STRESS DURATION SEC STAGE 2: 0
BH CV STRESS DURATION SEC STAGE 3: 0
BH CV STRESS DURATION SEC STAGE 4: 33
BH CV STRESS GRADE STAGE 1: 10
BH CV STRESS GRADE STAGE 2: 12
BH CV STRESS GRADE STAGE 3: 14
BH CV STRESS GRADE STAGE 4: 16
BH CV STRESS HR STAGE 1: 115
BH CV STRESS HR STAGE 2: 127
BH CV STRESS HR STAGE 3: 142
BH CV STRESS HR STAGE 4: 152
BH CV STRESS METS STAGE 1: 5
BH CV STRESS METS STAGE 2: 7.5
BH CV STRESS METS STAGE 3: 10
BH CV STRESS METS STAGE 4: 13.5
BH CV STRESS PROTOCOL 1: NORMAL
BH CV STRESS RECOVERY BP: NORMAL MMHG
BH CV STRESS RECOVERY HR: 98 BPM
BH CV STRESS SPEED STAGE 1: 1.7
BH CV STRESS SPEED STAGE 2: 2.5
BH CV STRESS SPEED STAGE 3: 3.4
BH CV STRESS SPEED STAGE 4: 4.2
BH CV STRESS STAGE 1: 1
BH CV STRESS STAGE 2: 2
BH CV STRESS STAGE 3: 3
BH CV STRESS STAGE 4: 4
BH CV XLRA - RV BASE: 3.9 CM
BH CV XLRA - RV MID: 3.2 CM
BH CV XLRA - TDI S': 9.6 CM/SEC
LEFT ATRIUM VOLUME INDEX: 19.2 ML/M2
MAXIMAL PREDICTED HEART RATE: 178 BPM
PERCENT MAX PREDICTED HR: 85.96 %
SINUS: 2.9 CM
STRESS BASELINE BP: NORMAL MMHG
STRESS BASELINE HR: 102 BPM
STRESS PERCENT HR: 101 %
STRESS POST ESTIMATED WORKLOAD: 10.1 METS
STRESS POST EXERCISE DUR MIN: 9 MIN
STRESS POST EXERCISE DUR SEC: 33 SEC
STRESS POST PEAK BP: NORMAL MMHG
STRESS POST PEAK HR: 153 BPM
STRESS TARGET HR: 151 BPM

## 2024-04-19 NOTE — TELEPHONE ENCOUNTER
----- Message from Rex Ramirez MD sent at 4/19/2024  1:29 PM EDT -----  Please let the patient know that his stress test was normal.  He also had a normal echocardiogram as well.  Will follow-up on his Holter monitor and see if he can find why he had the symptoms that he had.  Thanks

## 2024-05-22 ENCOUNTER — TELEPHONE (OUTPATIENT)
Dept: FAMILY MEDICINE CLINIC | Facility: CLINIC | Age: 44
End: 2024-05-22
Payer: COMMERCIAL

## 2024-05-22 DIAGNOSIS — R94.31 ABNORMAL HOLTER EXAM: Primary | ICD-10-CM

## 2024-05-22 NOTE — TELEPHONE ENCOUNTER
PT RETURNING CALL, UNSURE OF CONTENT. ADVISED OF ORDER FOR HOLTER; PT STATED HE HAD HAD HOLTER PREVIOUSLY AND WASN'T SURE WHAT HE WAS TO DO. ORDER HAD NO LOCATION.

## 2024-05-22 NOTE — TELEPHONE ENCOUNTER
Patient received a calls from our scheduling dept at the hospital, please see following note and ask patient to call them back. That is who called him.      Communications    Comments (most recent listed first): ROUTE BACK TO AMB - PT RUDELY REFUSED TO VERIFY CHART IN ORDER TO SCHEDULE APT   CAN NOT SCHEDULE UNLESS PT VERIFIES CHART  PT MAY CALL BACK WHEN THEY ARE READY TO VERIFY THEIR CHART   , INTERNAL VERIFIED NEW PT ROUTINE REFERRAL FROM [facility] TO [facility] FOR ABNORMAL HOLTER EXAM RECORDS IN CHART TELEM 03/08/24 LABS 03/13/24 ECHO, STRESS TEST 04/19/24 PN 04/22/24 HOLTER MONITOR 05/21/24 NAN

## 2024-05-22 NOTE — TELEPHONE ENCOUNTER
Called pt and advised on cardio monitor results. Advised pt that no other test were ordered, and that a cardiology referral was in place. Verbal understanding.

## 2024-05-23 ENCOUNTER — TELEPHONE (OUTPATIENT)
Dept: FAMILY MEDICINE CLINIC | Facility: CLINIC | Age: 44
End: 2024-05-23
Payer: COMMERCIAL

## 2024-05-23 DIAGNOSIS — R94.31 ABNORMAL HOLTER MONITOR FINDING: Primary | ICD-10-CM

## 2024-05-23 NOTE — TELEPHONE ENCOUNTER
HUB TO READ    Pleas let the patient know that his holter monitor was abnormal with an episode of an arrhythmia. I would like for him to go to cardiology for further evaluation and perhaps a cardiac catheterization to make sure he doesn't have any small vessel blockages causing the arrhythmia. Thanks

## 2024-05-30 ENCOUNTER — OFFICE VISIT (OUTPATIENT)
Dept: FAMILY MEDICINE CLINIC | Facility: CLINIC | Age: 44
End: 2024-05-30
Payer: COMMERCIAL

## 2024-05-30 VITALS
HEART RATE: 94 BPM | HEIGHT: 69 IN | DIASTOLIC BLOOD PRESSURE: 84 MMHG | RESPIRATION RATE: 16 BRPM | OXYGEN SATURATION: 97 % | BODY MASS INDEX: 27.37 KG/M2 | WEIGHT: 184.8 LBS | SYSTOLIC BLOOD PRESSURE: 122 MMHG

## 2024-05-30 DIAGNOSIS — H92.01 DISCOMFORT OF RIGHT EAR: ICD-10-CM

## 2024-05-30 DIAGNOSIS — H60.501 ACUTE OTITIS EXTERNA OF RIGHT EAR, UNSPECIFIED TYPE: Primary | ICD-10-CM

## 2024-05-30 DIAGNOSIS — I47.29 NONSUSTAINED VENTRICULAR TACHYCARDIA: ICD-10-CM

## 2024-05-30 PROCEDURE — 99214 OFFICE O/P EST MOD 30 MIN: CPT | Performed by: INTERNAL MEDICINE

## 2024-05-30 RX ORDER — OMEPRAZOLE 40 MG/1
CAPSULE, DELAYED RELEASE ORAL
COMMUNITY
Start: 2024-04-18

## 2024-05-30 RX ORDER — CIPROFLOXACIN/HYDROCORTISONE 0.2 %-1 %
3 SUSPENSION, DROPS(FINAL DOSAGE FORM)(ML) OTIC (EAR) 2 TIMES DAILY
Qty: 10 ML | Refills: 0 | Status: SHIPPED | OUTPATIENT
Start: 2024-05-30

## 2024-05-30 RX ORDER — SUCRALFATE 1 G/1
TABLET ORAL
COMMUNITY
Start: 2024-05-20

## 2024-05-30 NOTE — PROGRESS NOTES
Chief Complaint  Earache and Jaw Pain    HPI:    John Camarillo III presents to [facility]    Patient is a 44-year-old male with a history of cholesteatoma and chronic pain of the left ear, left eustachian tube dysfunction, vertigo, Orozco's esophagus, anxiety presenting for evaluation of right ear/jaw pain.    Patient reports that he has been having right sided ear pain that started approximately two days ago. He feels fluid draining along with pressure. He has also have been having some congestion and runny nose for which he takes claritin D.  Denies cough, sore throat, sinus pain/pressure, ear pain pressure, lymphadenopathy, myalgias, headache, and fatigue. Denies fever, chills, nausea, vomiting. Patient reports symptoms feel different from TMJ. Denies decreased hearing, tinnitus, dizziness, lightheadedness. Denies history of prior cerumen impactions. Denies history of wearing hearing aids but does use q-tip. Denies history of asthma, bronchitis, recurrent pneumonia, or tobacco use.    Patient previously seen for weakness, dizziness, and chest pain on 3/13/2024.  He underwent labs, treadmill stress test, echocardiogram, and Holter monitor.  Echo with EF of 56 to 60% without significant valvular abnormalities or diastolic dysfunction.  Treadmill stress test without evidence of ischemia.  Holter monitor notable for wide-complex tachycardia consistent with nonsustained ventricular tachycardia.  Patient referred to cardiology for further evaluation of arrhythmia.    Patient previously seen by PCP on 10/3/2023 for 2 weeks of right jaw pain.  Patient reported symptoms worse when he eats and grinds teeth at night.  Patient with tenderness over right TMJ.  Diagnosed with TMJ and recommended home Flexeril and steroid if not improving as well as dentist for mouthguard.      Review of Systems:  ROS negative unless otherwise noted in HPI above.    Past Medical History:   Diagnosis Date     Allergic     Anxiety     Asthma     GERD (gastroesophageal reflux disease)     Hiatal hernia     Irritable bowel syndrome     Peptic ulceration     S/P surgery for recurrent dislocation of shoulder 1998    Smoker 1998         Current Outpatient Medications:     EPINEPHrine (EPIPEN) 0.3 MG/0.3ML solution auto-injector injection, Inject 0.3 mL into the appropriate muscle as directed by prescriber 1 (One) Time., Disp: , Rfl:     loratadine-pseudoephedrine (Claritin-D 12 Hour) 5-120 MG per 12 hr tablet, Take 1 tablet by mouth 2 (Two) Times a Day., Disp: , Rfl:     omeprazole (priLOSEC) 40 MG capsule, TAKE 1 CAPSULE BY MOUTH TWICE DAILY 30 MINUTES BEFORE A MEAL, Disp: , Rfl:     VITAMIN D PO, Take  by mouth., Disp: , Rfl:     Wheat Dextrin (BENEFIBER PO), Take  by mouth., Disp: , Rfl:     ciprofloxacin-hydrocortisone (Cipro HC) 0.2-1 % otic suspension, Administer 3 drops to the right ear 2 (Two) Times a Day., Disp: 10 mL, Rfl: 0    diclofenac (VOLTAREN) 50 MG EC tablet, Take 1 tablet by mouth. (Patient not taking: Reported on 5/30/2024), Disp: , Rfl:     esomeprazole (nexIUM) 40 MG capsule, Take 1 capsule by mouth 2 (Two) Times a Day Before Meals. (Patient not taking: Reported on 5/30/2024), Disp: 180 capsule, Rfl: 3    sucralfate (CARAFATE) 1 g tablet, TAKE 1 TABLET BY MOUTH TWICE DAILY 30 MINUTES BEFORE A MEAL AND AT BEDTIME (Patient not taking: Reported on 5/30/2024), Disp: , Rfl:     Social History     Socioeconomic History    Marital status: Single   Tobacco Use    Smoking status: Every Day     Current packs/day: 0.50     Average packs/day: 0.5 packs/day for 26.4 years (13.2 ttl pk-yrs)     Types: Cigarettes     Start date: 1998    Smokeless tobacco: Never   Vaping Use    Vaping status: Never Used    Passive vaping exposure: Yes   Substance and Sexual Activity    Alcohol use: Yes     Alcohol/week: 5.0 standard drinks of alcohol     Types: 5 Cans of beer per week     Comment: raely    Drug use: Never    Sexual  "activity: Yes        Objective   Vital Signs:  /84   Pulse 94   Resp 16   Ht 175.3 cm (69\")   Wt 83.8 kg (184 lb 12.8 oz)   SpO2 97%   BMI 27.29 kg/m²   Estimated body mass index is 27.29 kg/m² as calculated from the following:    Height as of this encounter: 175.3 cm (69\").    Weight as of this encounter: 83.8 kg (184 lb 12.8 oz).    Physical Exam:  General: Well-appearing patient, no apparent distress  HEENT: No posterior pharynx erythema, no tonsillar erythema or exudates, right external auditory canal with erythema and drainage, TM normal without bulging or erythema, small middle ear effusions present bilaterally with right greater than left  Neck: No cervical lymphadenopathy  Cardiac: Regular rate and rhythm, normal S1/S2, no murmur, rubs or gallops, no lower extremity edema  Lungs: Clear to auscultation bilaterally, no crackles or wheezes  Skin: No significant rashes or lesions  MSK: Grossly normal tone and strength  Neuro: Alert and oriented x3, CN II-XII grossly intact  Psych: Appropriate mood and affect    Assessment and Plan:    (H60.501) Acute otitis externa of right ear, unspecified type  Assessment: Patient with erythema and drainage of the right ear consistent with otitis externa.  No evidence of otitis media.  Plan:  - Cipro HC eardrops as prescribed  - Avoid sticking objects in the ear  - Keep ear clean and dry    (H92.01) Discomfort of right ear   Assessment: Likely mildly factorial in the setting of otitis externa, eustachian tube dysfunction, and TMJ.  Plan:  - Otic eardrops as above  - Claritin-D as needed  - Start Flonase daily  - Over-the-counter analgesia as needed    (I47.29) Nonsustained ventricular tachycardia  Assessment: Previously noted on prior Holter monitor.  No recent reported episodes.  Due for follow-up with cardiology for further evaluation 6/26/2024.  Plan:  -Follow-up with cardiology as scheduled        Patient was given instructions and counseling regarding his " condition or for health maintenance advice. Please see specific information pulled into the AVS if appropriate.       Dr Sylvain Read   Internal Medicine Physician  [facility]  [address], [address]  [place] [address]

## 2024-05-30 NOTE — PATIENT INSTRUCTIONS
Ear drops as prescribed    Continue Claritin-D, can add Flonase    Follow up if new/worsening symptoms    Follow up with cardiology as scheduled

## 2024-06-04 ENCOUNTER — TELEPHONE (OUTPATIENT)
Dept: FAMILY MEDICINE CLINIC | Facility: CLINIC | Age: 44
End: 2024-06-04

## 2024-06-04 NOTE — TELEPHONE ENCOUNTER
"  Caller: John Camarillo III \"Gregoria\"    Relationship: Self    Best call back number: [phone]     What medication are you requesting: EAR DROPS    If a prescription is needed, what is your preferred pharmacy and phone number: [id number] - [place], [place] - [address] - [phone] [facility] [phone] FX     Additional notes: PATIENT WAS SEEN IN THE OFFICE ON 5/30/24. PATIENT STATED THAT THE PHARMACY WAS JUST NOW ABLE TO FILL THE EAR DROPS BUT IT WOULD COST HIM $475    PATIENT IS REQUESTING A CHEAPER ALTERNATIVE MEDICATION / EAR DROPS     PLEASE CALL TO ADVISE      "

## 2024-06-26 ENCOUNTER — OFFICE VISIT (OUTPATIENT)
Dept: CARDIOLOGY | Facility: CLINIC | Age: 44
End: 2024-06-26
Payer: COMMERCIAL

## 2024-06-26 VITALS
OXYGEN SATURATION: 95 % | RESPIRATION RATE: 18 BRPM | DIASTOLIC BLOOD PRESSURE: 82 MMHG | WEIGHT: 185.6 LBS | BODY MASS INDEX: 27.49 KG/M2 | HEART RATE: 89 BPM | HEIGHT: 69 IN | SYSTOLIC BLOOD PRESSURE: 127 MMHG

## 2024-06-26 DIAGNOSIS — I47.29 NON-SUSTAINED VENTRICULAR TACHYCARDIA: ICD-10-CM

## 2024-06-26 DIAGNOSIS — F17.210 TOBACCO DEPENDENCE DUE TO CIGARETTES: Primary | ICD-10-CM

## 2024-06-26 DIAGNOSIS — E78.5 DYSLIPIDEMIA: ICD-10-CM

## 2024-06-26 PROCEDURE — 99204 OFFICE O/P NEW MOD 45 MIN: CPT | Performed by: STUDENT IN AN ORGANIZED HEALTH CARE EDUCATION/TRAINING PROGRAM

## 2024-06-26 PROCEDURE — 93000 ELECTROCARDIOGRAM COMPLETE: CPT | Performed by: STUDENT IN AN ORGANIZED HEALTH CARE EDUCATION/TRAINING PROGRAM

## 2024-06-26 RX ORDER — NEEDLES, SAFETY 22GX1 1/2"
NEEDLE, DISPOSABLE MISCELLANEOUS
COMMUNITY
Start: 2024-06-04

## 2024-06-26 RX ORDER — FLUTICASONE PROPIONATE 50 MCG
2 SPRAY, SUSPENSION (ML) NASAL DAILY
COMMUNITY
Start: 2024-06-09

## 2024-06-26 NOTE — PROGRESS NOTES
"      Subjective:     Encounter Date:06/26/2024      Patient ID: John Camarillo III is a 44 y.o. male.    Chief Complaint:  Chief Complaint   Patient presents with    [facility]       HPI:  Pleasant 44-year-old gentleman with a past medical history of cholesteatoma Orozco's esophagus presents to establish care  Mentions occasional episodes of palpitations, recently had cardiac workup with primary care physician which revealed normal echocardiogram, event monitor showing brief nonsustained ventricular tachycardia and subsequent treadmill stress test which was normal with normal functional capacity and no significant arrhythmias.  Patient has normal functional capacity and denies loss of consciousness or red flag signs.  1 brief episode of nonsustained ventricular tachycardia however normal structure on echocardiogram and no evidence of ischemia on treadmill stress test  LDL is in the 190s        Objective:         /82 (BP Location: Left arm, Patient Position: Sitting, Cuff Size: Large Adult)   Pulse 89   Resp 18   Ht 175.3 cm (69\")   Wt 84.2 kg (185 lb 9.6 oz)   SpO2 95%   BMI 27.41 kg/m²     Physical exam  General-no acute distress  CVS-S1-S2 normal, no murmur  Respiratory-clear breath sounds  GI-soft nontender abdomen  No pedal edema  Alert oriented X3    ROS:  14 point review of systems negative except as mentioned above    Current Outpatient Medications:     B-D TB SYRINGE 1CC/27GX1/2\" 27G X 1/2\" 1 ML misc, USE AS NEEDED FOR ALLERGY INJECTIONS, Disp: , Rfl:     EPINEPHrine (EPIPEN) 0.3 MG/0.3ML solution auto-injector injection, Inject 0.3 mL into the appropriate muscle as directed by prescriber 1 (One) Time., Disp: , Rfl:     fluticasone (FLONASE) 50 MCG/ACT nasal spray, 2 sprays by Each Nare route Daily. Shake well before using., Disp: , Rfl:     loratadine-pseudoephedrine (Claritin-D 12 Hour) 5-120 MG per 12 hr tablet, Take 1 tablet by mouth 2 (Two) Times a Day., Disp: , Rfl:     " neomycin-polymyxin-hydrocortisone (CORTISPORIN) 3.5-52722-4 otic solution, Administer 3 drops into ear(s) as directed by provider 4 (Four) Times a Day., Disp: 10 mL, Rfl: 0    omeprazole (priLOSEC) 40 MG capsule, TAKE 1 CAPSULE BY MOUTH TWICE DAILY 30 MINUTES BEFORE A MEAL, Disp: , Rfl:     VITAMIN D PO, Take  by mouth., Disp: , Rfl:     Wheat Dextrin (BENEFIBER PO), Take  by mouth., Disp: , Rfl:     ciprofloxacin-hydrocortisone (Cipro HC) 0.2-1 % otic suspension, Administer 3 drops to the right ear 2 (Two) Times a Day. (Patient not taking: Reported on 6/26/2024), Disp: 10 mL, Rfl: 0    esomeprazole (nexIUM) 40 MG capsule, Take 1 capsule by mouth 2 (Two) Times a Day Before Meals. (Patient not taking: Reported on 6/26/2024), Disp: 180 capsule, Rfl: 3    sucralfate (CARAFATE) 1 g tablet, , Disp: , Rfl:     Problem List:  Patient Active Problem List   Diagnosis    Annual physical exam    Generalized anxiety disorder    Tobacco dependence due to cigarettes    Unintentional weight loss    Dyspnea on exertion    Chronic left ear pain    Dysfunction of left eustachian tube    Change in pigmented skin lesion of face    Intermittent left upper quadrant abdominal pain    Cholesteatoma of left ear    Disorder of penis    Vertigo    Strain of thoracic back region    Orozco's esophagus without dysplasia     Past Medical History:  Past Medical History:   Diagnosis Date    Allergic     Anxiety     Asthma     GERD (gastroesophageal reflux disease)     Hiatal hernia     Irritable bowel syndrome     Peptic ulceration     S/P surgery for recurrent dislocation of shoulder 1998    Smoker 1998     Past Surgical History:  Past Surgical History:   Procedure Laterality Date    CHOLECYSTECTOMY      COLONOSCOPY      ENDOSCOPY N/A 3/8/2024    Procedure: ESOPHAGOGASTRODUODENOSCOPY AND RADIOFREQUENCY ABLATION X2;  Surgeon: Mike Forrest MD;  Location: [facility] ENDOSCOPY;  Service: Gastroenterology;  Laterality: N/A;  esophageal  stricture, curtis's esophagus    EYE SURGERY      KNEE ACL RECONSTRUCTION Right 2015     Social History:  Social History     Socioeconomic History    Marital status: Single   Tobacco Use    Smoking status: Every Day     Current packs/day: 0.50     Average packs/day: 0.5 packs/day for 26.5 years (13.2 ttl pk-yrs)     Types: Cigarettes     Start date: 1998     Passive exposure: Current    Smokeless tobacco: Never   Vaping Use    Vaping status: Never Used    Passive vaping exposure: Yes   Substance and Sexual Activity    Alcohol use: Yes     Alcohol/week: 5.0 standard drinks of alcohol     Types: 5 Cans of beer per week     Comment: raely    Drug use: Never    Sexual activity: Yes     Allergies:  No Known Allergies  Immunizations:  Immunization History   Administered Date(s) Administered    Td, Unspecified 01/01/2015            In-Office Procedure(s):  No orders to display        ASCVD RIsk Score::  The 10-year ASCVD risk score (Ayse GAITAN, et al., 2019) is: 12.3%    Imaging:         Results for orders placed during the hospital encounter of 10/10/23    US Head Neck Soft Tissue    Narrative  US HEAD NECK SOFT TISSUE    Date of Exam: 10/10/2023 1:05 PM EDT    Indication: left posterior cerivcal swelling. Possible cyst but looking for lymphadenopathy.    Comparison: No comparisons available.    Technique: Sonographic evaluation of the soft tissues of the head and neck was performed. Real time imaging and documentation was obtained per protocol.      Findings:  In the posterior lateral left neck, there is a benign-appearing intramammary lymph node in the subcutaneous fat measuring 1.5 x 1.6 x 0.5 cm. It has a thin rim of cortical tissue and a normal echogenic fatty hilum. Slightly deeper in the same location is  a lymph node measuring 0.7 x 0.7 x 0.3 cm. It has a normal fatty hilum and a slightly thicker rim of cortical tissue. I would recommend clinical management.    Impression  Impression:  Benign-appearing lymph nodes  in the posterior lateral left neck. I would recommend clinical management.        Electronically Signed: Aleksander Jett MD  10/11/2023 10:30 AM EDT  Workstation ID: [id number]      Results for orders placed during the hospital encounter of 02/28/23    CT Temporal Bones Without Contrast    Narrative  CT TEMPORAL BONES WO CONTRAST    Date of Exam: 2/28/2023 11:50 AM EST    Indication: Tinnitus, nonpulsatile, asymmetric or unilateral  ear pain- ENT wanted to rule out canal cholesteatoma.    Comparison: None available.    Technique: Axial CT images were obtained of the internal auditory canals without contrast administration.  Sagittal and coronal reconstructions were performed.  Automated exposure control and iterative reconstruction methods were used.    Findings:    Right side:  The external auditory canal is clear. There is a mastoid effusion. The tympanic membrane is mildly thickened. The scutum is preserved. The middle ear ossicles have a normal configuration. There is soft tissue within the epitympanum within Prussak's  space, and mild soft tissue in the hypotympanum. The course of the 7th cranial nerve appears normal. The cochlea appears normal. There is no evidence of otosclerosis. The vestibule and semicircular canals appear intact, with no evidence of dehiscence.  The internal auditory canal appears normal in caliber. The vestibular aqueduct is not enlarged. There is no evidence of a high riding jugular bulb.    Left side:  The external auditory canal is clear. There is a mastoid effusion. The tympanic membrane is normal in thickness. The scutum is preserved. The middle ear ossicles have a normal configuration. There is a tiny amount of soft tissue in the epitympanum in  Prussak's space. The course of the 7th cranial nerve appears normal. The cochlea appears normal. There is no evidence of otosclerosis. The vestibule and semicircular canals appear intact, with no evidence of dehiscence. The internal auditory  canal  appears normal in caliber. The vestibular aqueduct is not enlarged. There is no evidence of a high riding jugular bulb.    Impression  Impression:  1.Right mastoid effusion with soft tissue in right middle ear cavity including Prussak's space, which could represent a developing cholesteatoma. No definite osseous erosions identified.  2.Left mastoid effusion with tiny amount of soft tissue in left middle ear cavity within Prussak's space, more nonspecific.    Electronically Signed: Palomo Peres  2/28/2023 3:41 PM EST  Workstation ID: [id number]        Most recent EKG as reviewed and interpreted by me:    ECG 12 Lead    Date/Time: 6/26/2024 12:01 PM  Performed by: Ingris Goldstein MD    Authorized by: Ingris Goldstein MD  Comparison: not compared with previous ECG   Previous ECG: no previous ECG available  Rhythm: sinus rhythm  Other findings: non-specific ST-T wave changes           Most recent echo as reviewed and interpreted by me:  Results for orders placed during the hospital encounter of 04/18/24    Adult Transthoracic Echo Complete W/ Cont if Necessary Per Protocol    Interpretation Summary    Left ventricular systolic function is normal. Left ventricular ejection fraction appears to be 56 - 60%.    Left ventricular diastolic function was normal.    TAPSE is 19.9.  RV ejection fraction is 48.4%      Most recent stress test as reviewed and interpreted by me:  Results for orders placed during the hospital encounter of 04/18/24    Treadmill Stress Test    Interpretation Summary    No ECG evidence of myocardial ischemia.    Negative clinical evidence of myocardial ischemia.    Findings consistent with a normal ECG stress test.    Patient walked for total of 9: 33 minutes and achieved workload of 10.1 METS      Most recent cardiac catheterization as reviewed interpreted by me:  No results found for this or any previous visit.      Assessment & Plan :         Palpitations  Nonsustained ventricular tachycardia, 11  beats  No red flag signs  Echocardiogram with normal ejection fraction  Treadmill stress test without evidence of myocardial ischemia  No pharmacotherapy at this point  If patient has recurrence, consider long-term monitoring    Dyslipidemia  No family history of premature CAD  -190  Counseled extensively on regular aerobic exercise, Mediterranean diet  Patient would prefer to check lipid panel after 6 months of lifestyle modifications  Can consider coronary artery calcium score to aid decision regarding statin therapy  However LDL more than 190 by itself is an indication for statin therapy    Part of this note may be an electronic transcription/translation of spoken language to printed text using the Dragon Dictation System.    Ingris Goldstein MD  06/26/24  .

## 2024-07-25 ENCOUNTER — OFFICE VISIT (OUTPATIENT)
Dept: FAMILY MEDICINE CLINIC | Facility: CLINIC | Age: 44
End: 2024-07-25
Payer: COMMERCIAL

## 2024-07-25 VITALS
WEIGHT: 188.4 LBS | DIASTOLIC BLOOD PRESSURE: 79 MMHG | OXYGEN SATURATION: 99 % | RESPIRATION RATE: 18 BRPM | BODY MASS INDEX: 27.91 KG/M2 | HEIGHT: 69 IN | SYSTOLIC BLOOD PRESSURE: 126 MMHG | HEART RATE: 103 BPM

## 2024-07-25 DIAGNOSIS — R05.1 ACUTE COUGH: ICD-10-CM

## 2024-07-25 DIAGNOSIS — R21 FACIAL RASH: Primary | ICD-10-CM

## 2024-07-25 PROCEDURE — 99213 OFFICE O/P EST LOW 20 MIN: CPT | Performed by: PHYSICIAN ASSISTANT

## 2024-07-25 RX ORDER — CEPHALEXIN 500 MG/1
TABLET ORAL
COMMUNITY
Start: 2024-07-24

## 2024-07-25 RX ORDER — PREDNISONE 20 MG/1
40 TABLET ORAL DAILY
Qty: 10 TABLET | Refills: 0 | Status: SHIPPED | OUTPATIENT
Start: 2024-07-25 | End: 2024-07-30

## 2024-07-25 RX ORDER — FLUTICASONE PROPIONATE 50 MCG
2 SPRAY, SUSPENSION (ML) NASAL DAILY
Qty: 48 G | Refills: 1 | Status: SHIPPED | OUTPATIENT
Start: 2024-07-25

## 2024-07-25 NOTE — PROGRESS NOTES
"Jaz Camarillo III is a 44 y.o. male.     Chief Complaint   Patient presents with    Rash     Rash on face       /79   Pulse 103   Resp 18   Ht 175.3 cm (69\")   Wt 85.5 kg (188 lb 6.4 oz)   SpO2 99%   BMI 27.82 kg/m²     BP Readings from Last 3 Encounters:   07/25/24 126/79   06/26/24 127/82   05/30/24 122/84       Wt Readings from Last 3 Encounters:   07/25/24 85.5 kg (188 lb 6.4 oz)   06/26/24 84.2 kg (185 lb 9.6 oz)   05/30/24 83.8 kg (184 lb 12.8 oz)       Rash     Presents to the clinic for cough and rash on his face. He had a rash under his eyes for the past few days. He went to the dermatologist yesterday and they gave him cephalexin for this. He has a seven day rx for this. He has had improvement. He had some itching in that area. He kept his appointment today because he felt like he had a cough developing and something stuck in his throat. He denies fevers or increasing congestion. He has not had production with the cough. He denies increasing reflux.     The following portions of the patient's history were reviewed and updated as appropriate: allergies, current medications, past family history, past medical history, past social history, past surgical history, and problem list.    Review of Systems   Skin:  Positive for rash.       Objective   Physical Exam  Constitutional:       Appearance: He is well-developed.   HENT:      Head: Normocephalic and atraumatic.      Right Ear: Tympanic membrane normal.      Left Ear: Tympanic membrane normal.      Nose: No congestion.      Mouth/Throat:      Pharynx: No oropharyngeal exudate.   Eyes:      Conjunctiva/sclera: Conjunctivae normal.      Pupils: Pupils are equal, round, and reactive to light.   Cardiovascular:      Rate and Rhythm: Normal rate and regular rhythm.      Heart sounds: No murmur heard.  Pulmonary:      Effort: Pulmonary effort is normal.      Breath sounds: Normal breath sounds.   Musculoskeletal:         General: No " deformity. Normal range of motion.      Cervical back: Normal range of motion and neck supple.   Lymphadenopathy:      Cervical: No cervical adenopathy.   Skin:     General: Skin is warm and dry.      Capillary Refill: Capillary refill takes less than 2 seconds.      Findings: No rash.   Neurological:      Mental Status: He is alert and oriented to person, place, and time.      Cranial Nerves: No cranial nerve deficit.      Coordination: Coordination normal.      Gait: Gait normal.   Psychiatric:         Behavior: Behavior normal.         Thought Content: Thought content normal.         Judgment: Judgment normal.           Diagnoses and all orders for this visit:    1. Facial rash (Primary)    2. Acute cough    Other orders  -     predniSONE (DELTASONE) 20 MG tablet; Take 2 tablets by mouth Daily for 5 days.  Dispense: 10 tablet; Refill: 0    Was treated for cellulitis by derm. Can continue this treatment. I would be surprised that this was cellulitis however I did not see it- he has had one pill of keflex and essentially gone. Seems to be heat related and itched some. Possible urticarial rash. Going on cruise. I can give him a steroid just in case it returns for the cruise and he can take antihistamine as discussed.     No follow-ups on file.

## 2024-08-15 ENCOUNTER — LAB (OUTPATIENT)
Dept: FAMILY MEDICINE CLINIC | Facility: CLINIC | Age: 44
End: 2024-08-15
Payer: COMMERCIAL

## 2024-08-15 ENCOUNTER — OFFICE VISIT (OUTPATIENT)
Dept: FAMILY MEDICINE CLINIC | Facility: CLINIC | Age: 44
End: 2024-08-15
Payer: COMMERCIAL

## 2024-08-15 VITALS
HEART RATE: 107 BPM | RESPIRATION RATE: 18 BRPM | BODY MASS INDEX: 28.11 KG/M2 | HEIGHT: 69 IN | OXYGEN SATURATION: 96 % | WEIGHT: 189.8 LBS | SYSTOLIC BLOOD PRESSURE: 139 MMHG | DIASTOLIC BLOOD PRESSURE: 91 MMHG

## 2024-08-15 DIAGNOSIS — R61 EXCESSIVE SWEATING: ICD-10-CM

## 2024-08-15 DIAGNOSIS — R23.2 FLUSHING: ICD-10-CM

## 2024-08-15 DIAGNOSIS — R21 FACIAL RASH: ICD-10-CM

## 2024-08-15 DIAGNOSIS — R21 FACIAL RASH: Primary | ICD-10-CM

## 2024-08-15 LAB
BASOPHILS # BLD AUTO: 0.05 10*3/MM3 (ref 0–0.2)
BASOPHILS NFR BLD AUTO: 0.7 % (ref 0–1.5)
DEPRECATED RDW RBC AUTO: 44.9 FL (ref 37–54)
EOSINOPHIL # BLD AUTO: 0.25 10*3/MM3 (ref 0–0.4)
EOSINOPHIL NFR BLD AUTO: 3.5 % (ref 0.3–6.2)
ERYTHROCYTE [DISTWIDTH] IN BLOOD BY AUTOMATED COUNT: 14.2 % (ref 12.3–15.4)
HCT VFR BLD AUTO: 44.6 % (ref 37.5–51)
HGB BLD-MCNC: 15 G/DL (ref 13–17.7)
IMM GRANULOCYTES # BLD AUTO: 0.01 10*3/MM3 (ref 0–0.05)
IMM GRANULOCYTES NFR BLD AUTO: 0.1 % (ref 0–0.5)
LYMPHOCYTES # BLD AUTO: 3.1 10*3/MM3 (ref 0.7–3.1)
LYMPHOCYTES NFR BLD AUTO: 43.4 % (ref 19.6–45.3)
MCH RBC QN AUTO: 29.1 PG (ref 26.6–33)
MCHC RBC AUTO-ENTMCNC: 33.6 G/DL (ref 31.5–35.7)
MCV RBC AUTO: 86.6 FL (ref 79–97)
MONOCYTES # BLD AUTO: 0.7 10*3/MM3 (ref 0.1–0.9)
MONOCYTES NFR BLD AUTO: 9.8 % (ref 5–12)
NEUTROPHILS NFR BLD AUTO: 3.03 10*3/MM3 (ref 1.7–7)
NEUTROPHILS NFR BLD AUTO: 42.5 % (ref 42.7–76)
NRBC BLD AUTO-RTO: 0 /100 WBC (ref 0–0.2)
PLATELET # BLD AUTO: 325 10*3/MM3 (ref 140–450)
PMV BLD AUTO: 10 FL (ref 6–12)
RBC # BLD AUTO: 5.15 10*6/MM3 (ref 4.14–5.8)
WBC NRBC COR # BLD AUTO: 7.14 10*3/MM3 (ref 3.4–10.8)

## 2024-08-15 PROCEDURE — 84439 ASSAY OF FREE THYROXINE: CPT | Performed by: PHYSICIAN ASSISTANT

## 2024-08-15 PROCEDURE — 86038 ANTINUCLEAR ANTIBODIES: CPT | Performed by: PHYSICIAN ASSISTANT

## 2024-08-15 PROCEDURE — 83520 IMMUNOASSAY QUANT NOS NONAB: CPT | Performed by: PHYSICIAN ASSISTANT

## 2024-08-15 PROCEDURE — 80050 GENERAL HEALTH PANEL: CPT | Performed by: PHYSICIAN ASSISTANT

## 2024-08-15 PROCEDURE — 36415 COLL VENOUS BLD VENIPUNCTURE: CPT

## 2024-08-15 PROCEDURE — 99214 OFFICE O/P EST MOD 30 MIN: CPT | Performed by: PHYSICIAN ASSISTANT

## 2024-08-15 RX ORDER — DIAPER,BRIEF,INFANT-TODD,DISP
1 EACH MISCELLANEOUS 2 TIMES DAILY
Qty: 112 G | Refills: 0 | Status: SHIPPED | OUTPATIENT
Start: 2024-08-15 | End: 2024-10-10

## 2024-08-15 RX ORDER — PREDNISONE 10 MG/1
TABLET ORAL
Qty: 30 TABLET | Refills: 0 | Status: SHIPPED | OUTPATIENT
Start: 2024-08-15 | End: 2024-08-27

## 2024-08-15 NOTE — PROGRESS NOTES
"Jaz Camarillo III is a 44 y.o. male.     Chief Complaint   Patient presents with    Rash     Rash on face       /91   Pulse 107   Resp 18   Ht 175.3 cm (69\")   Wt 86.1 kg (189 lb 12.8 oz)   SpO2 96%   BMI 28.03 kg/m²     BP Readings from Last 3 Encounters:   08/15/24 139/91   07/25/24 126/79   06/26/24 127/82       Wt Readings from Last 3 Encounters:   08/15/24 86.1 kg (189 lb 12.8 oz)   07/25/24 85.5 kg (188 lb 6.4 oz)   06/26/24 84.2 kg (185 lb 9.6 oz)       HPI Presents to the clinic for follow up on a facial rash. He noticed this previously a few weeks ago and was seen by myself and derm. Unfortunately at the time of the visit then the rash was not there. Derm put him on keflex but the rash went away just after one dose. He denies any changes to creams, lotions, shampoos, etc.. the rash does itch some when it happens and he sweats a lot. It does seem to be associated some with heat.     The following portions of the patient's history were reviewed and updated as appropriate: allergies, current medications, past family history, past medical history, past social history, past surgical history, and problem list.    Review of Systems    Objective   Physical Exam  Constitutional:       Appearance: He is well-developed.   HENT:      Head: Normocephalic and atraumatic.   Eyes:      Conjunctiva/sclera: Conjunctivae normal.      Pupils: Pupils are equal, round, and reactive to light.   Cardiovascular:      Rate and Rhythm: Normal rate and regular rhythm.      Heart sounds: No murmur heard.  Pulmonary:      Effort: Pulmonary effort is normal.      Breath sounds: Normal breath sounds.   Abdominal:      General: Bowel sounds are normal.      Palpations: Abdomen is soft.      Tenderness: There is no abdominal tenderness.   Musculoskeletal:         General: No deformity. Normal range of motion.      Cervical back: Normal range of motion and neck supple.   Lymphadenopathy:      Cervical: No cervical " adenopathy.   Skin:     General: Skin is warm and dry.      Capillary Refill: Capillary refill takes less than 2 seconds.      Findings: Rash (facial rash sparing nasolabial fold. Does appear to have fairly demarcated borders. not warm to touch and does not katia. It spares his forehead and appears to be in a sunglass shape almost.) present.   Neurological:      Mental Status: He is alert and oriented to person, place, and time.      Cranial Nerves: No cranial nerve deficit.      Coordination: Coordination normal.      Gait: Gait normal.   Psychiatric:         Behavior: Behavior normal.         Thought Content: Thought content normal.         Judgment: Judgment normal.           Diagnoses and all orders for this visit:    1. Facial rash (Primary)  -     TOMASA; Future  -     CBC w AUTO Differential; Future  -     Tryptase; Future  -     Comprehensive metabolic panel; Future    2. Flushing  -     TOMASA; Future  -     CBC w AUTO Differential; Future  -     Tryptase; Future  -     Comprehensive metabolic panel; Future    3. Excessive sweating  -     TOMASA; Future  -     CBC w AUTO Differential; Future  -     Tryptase; Future  -     Comprehensive metabolic panel; Future  -     TSH; Future  -     T4, free; Future    Other orders  -     hydrocortisone 1 % ointment; Apply 1 Application topically to the appropriate area as directed 2 (Two) Times a Day for 56 days.  Dispense: 112 g; Refill: 0  -     predniSONE (DELTASONE) 10 MG tablet; Take 4 tablets by mouth Daily for 3 days, THEN 3 tablets Daily for 3 days, THEN 2 tablets Daily for 3 days, THEN 1 tablet Daily for 3 days.  Dispense: 30 tablet; Refill: 0      At this point the rash seems to be a contact dermatitis rash worsened by heat and sweat. I want him to monitor things that he would put on his face especially at work with his glasses and helmet. We can try a steroid taper to see if it improves. Due to persistence and reoccurrence and his excessive sweating we will check some  labs today as well. We can treat the excessive sweating if he wants going forward.     No follow-ups on file.

## 2024-08-16 ENCOUNTER — TELEPHONE (OUTPATIENT)
Dept: FAMILY MEDICINE CLINIC | Facility: CLINIC | Age: 44
End: 2024-08-16
Payer: COMMERCIAL

## 2024-08-16 LAB
ALBUMIN SERPL-MCNC: 4.4 G/DL (ref 3.5–5.2)
ALBUMIN/GLOB SERPL: 1.8 G/DL
ALP SERPL-CCNC: 96 U/L (ref 39–117)
ALT SERPL W P-5'-P-CCNC: 36 U/L (ref 1–41)
ANA SER QL: NEGATIVE
ANION GAP SERPL CALCULATED.3IONS-SCNC: 8.6 MMOL/L (ref 5–15)
AST SERPL-CCNC: 24 U/L (ref 1–40)
BILIRUB SERPL-MCNC: 0.4 MG/DL (ref 0–1.2)
BUN SERPL-MCNC: 15 MG/DL (ref 6–20)
BUN/CREAT SERPL: 15.2 (ref 7–25)
CALCIUM SPEC-SCNC: 9.4 MG/DL (ref 8.6–10.5)
CHLORIDE SERPL-SCNC: 105 MMOL/L (ref 98–107)
CO2 SERPL-SCNC: 26.4 MMOL/L (ref 22–29)
CREAT SERPL-MCNC: 0.99 MG/DL (ref 0.76–1.27)
EGFRCR SERPLBLD CKD-EPI 2021: 96.9 ML/MIN/1.73
GLOBULIN UR ELPH-MCNC: 2.4 GM/DL
GLUCOSE SERPL-MCNC: 97 MG/DL (ref 65–99)
POTASSIUM SERPL-SCNC: 4.2 MMOL/L (ref 3.5–5.2)
PROT SERPL-MCNC: 6.8 G/DL (ref 6–8.5)
SODIUM SERPL-SCNC: 140 MMOL/L (ref 136–145)
T4 FREE SERPL-MCNC: 1.22 NG/DL (ref 0.92–1.68)
TSH SERPL DL<=0.05 MIU/L-ACNC: 1.48 UIU/ML (ref 0.27–4.2)

## 2024-08-16 NOTE — LETTER
August 16, 2024      [facility]  [address] [place] [address]          Patient: John Camarillo III   YOB: 1980   Date of Visit: 8/15/2024       To Whom It May Concern:    John Camarillo III was seen at [facility] on 8/15/2024.            Sincerely,       Eamon German PA-C

## 2024-08-16 NOTE — TELEPHONE ENCOUNTER
Mr Camarillo was seen in the office yesterday by Luke German PA-C and forgot to get a work note for his visit yesterday.  Please email note to him at [email or web address]

## 2024-08-16 NOTE — TELEPHONE ENCOUNTER
"  Caller: John Camarillo III \"Gregoria\"    Relationship: Self    Best call back number:     [phone] (       What was the call regarding:   REQUEST DOCTORS NOTE FOR APPOINTMENT ON 8/15/24  CAN YOU EMAIL NOTE TO:  [email or web address]       "

## 2024-08-18 LAB — TRYPTASE SERPL-MCNC: 12.5 UG/L (ref 2.2–13.2)

## 2024-08-23 ENCOUNTER — TELEPHONE (OUTPATIENT)
Dept: FAMILY MEDICINE CLINIC | Facility: CLINIC | Age: 44
End: 2024-08-23

## 2024-08-27 ENCOUNTER — OFFICE VISIT (OUTPATIENT)
Dept: FAMILY MEDICINE CLINIC | Facility: CLINIC | Age: 44
End: 2024-08-27
Payer: COMMERCIAL

## 2024-08-27 VITALS
BODY MASS INDEX: 27.88 KG/M2 | HEART RATE: 107 BPM | RESPIRATION RATE: 16 BRPM | DIASTOLIC BLOOD PRESSURE: 80 MMHG | OXYGEN SATURATION: 97 % | WEIGHT: 188.25 LBS | SYSTOLIC BLOOD PRESSURE: 138 MMHG | HEIGHT: 69 IN

## 2024-08-27 DIAGNOSIS — M62.830 SPASM OF THORACIC BACK MUSCLE: Primary | ICD-10-CM

## 2024-08-27 PROCEDURE — 96372 THER/PROPH/DIAG INJ SC/IM: CPT | Performed by: STUDENT IN AN ORGANIZED HEALTH CARE EDUCATION/TRAINING PROGRAM

## 2024-08-27 PROCEDURE — 99213 OFFICE O/P EST LOW 20 MIN: CPT | Performed by: STUDENT IN AN ORGANIZED HEALTH CARE EDUCATION/TRAINING PROGRAM

## 2024-08-27 RX ORDER — TIZANIDINE HYDROCHLORIDE 4 MG/1
4 CAPSULE, GELATIN COATED ORAL 3 TIMES DAILY PRN
Qty: 21 CAPSULE | Refills: 0 | Status: SHIPPED | OUTPATIENT
Start: 2024-08-27

## 2024-08-27 RX ORDER — METHYLPREDNISOLONE ACETATE 80 MG/ML
120 INJECTION, SUSPENSION INTRA-ARTICULAR; INTRALESIONAL; INTRAMUSCULAR; SOFT TISSUE ONCE
Status: COMPLETED | OUTPATIENT
Start: 2024-08-27 | End: 2024-08-27

## 2024-08-27 RX ADMIN — METHYLPREDNISOLONE ACETATE 120 MG: 80 INJECTION, SUSPENSION INTRA-ARTICULAR; INTRALESIONAL; INTRAMUSCULAR; SOFT TISSUE at 13:40

## 2024-08-27 NOTE — PROGRESS NOTES
"Chief Complaint  Shoulder Pain (Left - pain and numbness. Started Sunday night. )    Subjective        John Huntern III presents to [facility]  History of Present Illness  Gregoria is a 41-year-old with no significant past medical history presents today due to left shoulder blade pain and superficial numbness that started 2 nights ago.  Stated that he was washing his boat over the weekend but otherwise did not do any repetitive activities.  States that it stays in the shoulder blade and has difficulty with pain with movement of certain things including raising his arms and going back.  Denies any shortness of breath, numbness running down the back of his legs, recent trauma to the area.  Denies any skin lesions.  Has been trying diclofenac without much relief of symptoms.        Objective   Vital Signs:  /80   Pulse 107   Resp 16   Ht 175.3 cm (69\")   Wt 85.4 kg (188 lb 4 oz)   SpO2 97%   BMI 27.80 kg/m²   Estimated body mass index is 27.8 kg/m² as calculated from the following:    Height as of this encounter: 175.3 cm (69\").    Weight as of this encounter: 85.4 kg (188 lb 4 oz).            Physical Exam  Cardiovascular:      Rate and Rhythm: Normal rate and regular rhythm.      Pulses: Normal pulses.      Heart sounds: No murmur heard.  Pulmonary:      Effort: Pulmonary effort is normal. No respiratory distress.      Breath sounds: Normal breath sounds. No wheezing.   Abdominal:      General: Abdomen is flat.      Palpations: Abdomen is soft.   Musculoskeletal:         General: Tenderness (Under the left scapula, with muscle spasm noted) present. No swelling or signs of injury. Normal range of motion.      Comments: Normal passive range of motion of the left upper extremity, normal strength, tenderness to palpation over the left shoulder blade,   Skin:     General: Skin is warm.   Neurological:      General: No focal deficit present.      Mental Status: He is oriented to " person, place, and time.      Cranial Nerves: No cranial nerve deficit.      Motor: No weakness.        Result Review :  The following data was reviewed by: Rex Ramirez MD on 08/27/2024:  Common labs          12/5/2023    15:42 3/13/2024    08:47 8/15/2024    15:45   Common Labs   Glucose 74  75  97    BUN 13  12  15    Creatinine 1.18  1.02  0.99    Sodium 141  139  140    Potassium 3.7  4.4  4.2    Chloride 102  102  105    Calcium 9.5  9.1  9.4    Albumin 4.4  4.3  4.4    Total Bilirubin 0.2  0.3  0.4    Alkaline Phosphatase 75  90  96    AST (SGOT) 16  21  24    ALT (SGPT) 25  24  36    WBC 9.73   7.14    Hemoglobin 16.7   15.0    Hematocrit 48.4   44.6    Platelets 310   325    Total Cholesterol 258  234     Triglycerides 287  71     HDL Cholesterol 29  29     LDL Cholesterol  174  193       Data reviewed : Previous notes           Assessment and Plan   Diagnoses and all orders for this visit:    1. Spasm of thoracic back muscle (Primary)  -     methylPREDNISolone acetate (DEPO-medrol) injection 120 mg    Other orders  -     TiZANidine (ZANAFLEX) 4 MG capsule; Take 1 capsule by mouth 3 (Three) Times a Day As Needed for Muscle Spasms.  Dispense: 21 capsule; Refill: 0    Based on his signs and symptoms, likely muscle spasm of his left back.  Will give him a muscle relaxer and also a steroid shot.  He should return he has worsening symptoms.  Discussed emergent things to come back.  Consider further imaging if he does not improve.  Can continue use over-the-counter NSAIDs and Tylenol.         Follow Up   Return if symptoms worsen or fail to improve.  Patient was given instructions and counseling regarding his condition or for health maintenance advice. Please see specific information pulled into the AVS if appropriate.

## 2024-09-04 ENCOUNTER — OFFICE VISIT (OUTPATIENT)
Dept: FAMILY MEDICINE CLINIC | Facility: CLINIC | Age: 44
End: 2024-09-04
Payer: COMMERCIAL

## 2024-09-04 VITALS
DIASTOLIC BLOOD PRESSURE: 90 MMHG | RESPIRATION RATE: 18 BRPM | OXYGEN SATURATION: 96 % | BODY MASS INDEX: 27.73 KG/M2 | SYSTOLIC BLOOD PRESSURE: 139 MMHG | HEIGHT: 69 IN | WEIGHT: 187.2 LBS | HEART RATE: 103 BPM

## 2024-09-04 DIAGNOSIS — U07.1 COVID-19: Primary | ICD-10-CM

## 2024-09-04 LAB
EXPIRATION DATE: ABNORMAL
FLUAV AG UPPER RESP QL IA.RAPID: NOT DETECTED
FLUBV AG UPPER RESP QL IA.RAPID: NOT DETECTED
INTERNAL CONTROL: ABNORMAL
Lab: ABNORMAL
SARS-COV-2 AG UPPER RESP QL IA.RAPID: DETECTED

## 2024-09-04 PROCEDURE — 87428 SARSCOV & INF VIR A&B AG IA: CPT | Performed by: NURSE PRACTITIONER

## 2024-09-04 PROCEDURE — 99213 OFFICE O/P EST LOW 20 MIN: CPT | Performed by: NURSE PRACTITIONER

## 2024-09-04 RX ORDER — METHYLPREDNISOLONE 4 MG
TABLET, DOSE PACK ORAL
Qty: 21 TABLET | Refills: 0 | Status: SHIPPED | OUTPATIENT
Start: 2024-09-04

## 2024-09-04 NOTE — PROGRESS NOTES
"Chief Complaint  Nasal Congestion, Earache (Right ear pain ), and Sore Throat  Subjective        John Camarillo III presents to [facility]  History of Present Illness  Pt comes in today with c/o nasal congestion, ear pain, head congestion, sore throat, sneezing, cough.  Symptoms started about 2-3 days ago.  Taking flonase otc.     Earache   Associated symptoms include a sore throat.   Sore Throat   Associated symptoms include ear pain.     Review of Systems   HENT:  Positive for ear pain and sore throat.      Objective     Vital Signs:   /90   Pulse 103   Resp 18   Ht 175.3 cm (69.02\")   Wt 84.9 kg (187 lb 3.2 oz)   SpO2 96%   BMI 27.63 kg/m²       BP Readings from Last 3 Encounters:   09/04/24 139/90   08/27/24 138/80   08/15/24 139/91       Wt Readings from Last 3 Encounters:   09/04/24 84.9 kg (187 lb 3.2 oz)   08/27/24 85.4 kg (188 lb 4 oz)   08/15/24 86.1 kg (189 lb 12.8 oz)     Physical Exam  Constitutional:       Appearance: He is well-developed.   Eyes:      Pupils: Pupils are equal, round, and reactive to light.   Cardiovascular:      Rate and Rhythm: Normal rate and regular rhythm.   Pulmonary:      Effort: Pulmonary effort is normal.      Breath sounds: Normal breath sounds.   Neurological:      Mental Status: He is alert and oriented to person, place, and time.      Result Review :                 Assessment and Plan    Diagnoses and all orders for this visit:    1. COVID-19 (Primary)  -     POCT SARS-CoV-2 Antigen BESSY + Flu  -     methylPREDNISolone (MEDROL) 4 MG dose pack; Take as directed on package instructions.  Dispense: 21 tablet; Refill: 0    COVID +  Medrol dose pack  Tylenol/IBU prn  Push fluids  During this office visit, we discussed the pertinent aspects of the visit and treatment recommendations. Pt verbalizes understanding. Follow up was discussed. Patient was given the opportunity to ask questions and discuss other concerns.         Follow Up "   Return if symptoms worsen or fail to improve.  Patient was given instructions and counseling regarding his condition or for health maintenance advice. Please see specific information pulled into the AVS if appropriate.

## 2024-09-18 ENCOUNTER — ON CAMPUS - OUTPATIENT (AMBULATORY)
Age: 44
End: 2024-09-18
Payer: COMMERCIAL

## 2024-09-18 ENCOUNTER — ON CAMPUS - OUTPATIENT (AMBULATORY)
Dept: URBAN - METROPOLITAN AREA HOSPITAL 85 | Facility: HOSPITAL | Age: 44
End: 2024-09-18
Payer: COMMERCIAL

## 2024-09-18 ENCOUNTER — HOSPITAL ENCOUNTER (OUTPATIENT)
Facility: HOSPITAL | Age: 44
Setting detail: HOSPITAL OUTPATIENT SURGERY
Discharge: HOME | End: 2024-09-18
Attending: INTERNAL MEDICINE | Admitting: INTERNAL MEDICINE
Payer: COMMERCIAL

## 2024-09-18 ENCOUNTER — ANESTHESIA (OUTPATIENT)
Dept: GASTROENTEROLOGY | Facility: HOSPITAL | Age: 44
End: 2024-09-18
Payer: COMMERCIAL

## 2024-09-18 ENCOUNTER — ANESTHESIA EVENT (OUTPATIENT)
Dept: GASTROENTEROLOGY | Facility: HOSPITAL | Age: 44
End: 2024-09-18
Payer: COMMERCIAL

## 2024-09-18 VITALS
OXYGEN SATURATION: 98 % | SYSTOLIC BLOOD PRESSURE: 115 MMHG | HEIGHT: 69 IN | DIASTOLIC BLOOD PRESSURE: 57 MMHG | TEMPERATURE: 98.3 F | WEIGHT: 187.8 LBS | HEART RATE: 70 BPM | BODY MASS INDEX: 27.81 KG/M2 | RESPIRATION RATE: 14 BRPM

## 2024-09-18 DIAGNOSIS — K22.70 BARRETT'S ESOPHAGUS WITHOUT DYSPLASIA: ICD-10-CM

## 2024-09-18 DIAGNOSIS — K20.0 EOSINOPHILIC ESOPHAGITIS: ICD-10-CM

## 2024-09-18 DIAGNOSIS — K44.9 DIAPHRAGMATIC HERNIA WITHOUT OBSTRUCTION OR GANGRENE: ICD-10-CM

## 2024-09-18 PROCEDURE — 43270 EGD LESION ABLATION: CPT | Performed by: INTERNAL MEDICINE

## 2024-09-18 PROCEDURE — 25010000002 PROPOFOL 10 MG/ML EMULSION: Performed by: NURSE ANESTHETIST, CERTIFIED REGISTERED

## 2024-09-18 PROCEDURE — 25810000003 SODIUM CHLORIDE 0.9 % SOLUTION: Performed by: NURSE ANESTHETIST, CERTIFIED REGISTERED

## 2024-09-18 PROCEDURE — C1733 CATH, EP, OTHR THAN COOL-TIP: HCPCS | Performed by: INTERNAL MEDICINE

## 2024-09-18 PROCEDURE — 25810000003 SODIUM CHLORIDE 0.9 % SOLUTION: Performed by: INTERNAL MEDICINE

## 2024-09-18 RX ORDER — SODIUM CHLORIDE 9 MG/ML
INJECTION, SOLUTION INTRAVENOUS CONTINUOUS PRN
Status: DISCONTINUED | OUTPATIENT
Start: 2024-09-18 | End: 2024-09-18 | Stop reason: SURG

## 2024-09-18 RX ORDER — ACETYLCYSTEINE 200 MG/ML
SOLUTION ORAL; RESPIRATORY (INHALATION) AS NEEDED
Status: DISCONTINUED | OUTPATIENT
Start: 2024-09-18 | End: 2024-09-18 | Stop reason: HOSPADM

## 2024-09-18 RX ORDER — LIDOCAINE HYDROCHLORIDE 20 MG/ML
INJECTION, SOLUTION EPIDURAL; INFILTRATION; INTRACAUDAL; PERINEURAL AS NEEDED
Status: DISCONTINUED | OUTPATIENT
Start: 2024-09-18 | End: 2024-09-18 | Stop reason: SURG

## 2024-09-18 RX ORDER — PROPOFOL 10 MG/ML
VIAL (ML) INTRAVENOUS AS NEEDED
Status: DISCONTINUED | OUTPATIENT
Start: 2024-09-18 | End: 2024-09-18 | Stop reason: SURG

## 2024-09-18 RX ORDER — ONDANSETRON 2 MG/ML
4 INJECTION INTRAMUSCULAR; INTRAVENOUS ONCE AS NEEDED
Status: DISCONTINUED | OUTPATIENT
Start: 2024-09-18 | End: 2024-09-18 | Stop reason: HOSPADM

## 2024-09-18 RX ORDER — SODIUM CHLORIDE 9 MG/ML
9 INJECTION, SOLUTION INTRAVENOUS ONCE
Status: COMPLETED | OUTPATIENT
Start: 2024-09-18 | End: 2024-09-18

## 2024-09-18 RX ADMIN — PROPOFOL 100 MG: 10 INJECTION, EMULSION INTRAVENOUS at 09:08

## 2024-09-18 RX ADMIN — LIDOCAINE HYDROCHLORIDE 60 MG: 20 INJECTION, SOLUTION EPIDURAL; INFILTRATION; INTRACAUDAL; PERINEURAL at 09:04

## 2024-09-18 RX ADMIN — SODIUM CHLORIDE 9 ML/HR: 9 INJECTION, SOLUTION INTRAVENOUS at 08:26

## 2024-09-18 RX ADMIN — SODIUM CHLORIDE: 9 INJECTION, SOLUTION INTRAVENOUS at 08:59

## 2024-09-18 RX ADMIN — PROPOFOL 200 MG: 10 INJECTION, EMULSION INTRAVENOUS at 09:04

## 2024-09-18 RX ADMIN — PROPOFOL 100 MG: 10 INJECTION, EMULSION INTRAVENOUS at 09:12

## 2024-11-19 ENCOUNTER — OFFICE VISIT (OUTPATIENT)
Dept: FAMILY MEDICINE CLINIC | Facility: CLINIC | Age: 44
End: 2024-11-19
Payer: COMMERCIAL

## 2024-11-19 VITALS
BODY MASS INDEX: 27.85 KG/M2 | HEIGHT: 69 IN | SYSTOLIC BLOOD PRESSURE: 138 MMHG | RESPIRATION RATE: 14 BRPM | OXYGEN SATURATION: 96 % | HEART RATE: 114 BPM | WEIGHT: 188 LBS | DIASTOLIC BLOOD PRESSURE: 72 MMHG

## 2024-11-19 DIAGNOSIS — R03.0 ELEVATED BLOOD PRESSURE READING: ICD-10-CM

## 2024-11-19 DIAGNOSIS — F17.210 TOBACCO DEPENDENCE DUE TO CIGARETTES: ICD-10-CM

## 2024-11-19 DIAGNOSIS — F41.1 GAD (GENERALIZED ANXIETY DISORDER): Primary | ICD-10-CM

## 2024-11-19 PROCEDURE — 99214 OFFICE O/P EST MOD 30 MIN: CPT | Performed by: PHYSICIAN ASSISTANT

## 2024-11-19 RX ORDER — DESVENLAFAXINE 50 MG/1
50 TABLET, FILM COATED, EXTENDED RELEASE ORAL DAILY
Qty: 90 TABLET | Refills: 3 | Status: SHIPPED | OUTPATIENT
Start: 2024-11-19

## 2024-11-19 NOTE — PROGRESS NOTES
"Jaz Camarillo III is a 44 y.o. male.     Chief Complaint   Patient presents with    Anxiety    Hypertension       /72 (BP Location: Left arm, Patient Position: Sitting, Cuff Size: Adult)   Pulse 114   Resp 14   Ht 175.3 cm (69\")   Wt 85.3 kg (188 lb)   SpO2 96%   BMI 27.76 kg/m²     BP Readings from Last 3 Encounters:   11/19/24 138/72   09/18/24 115/57   09/04/24 139/90       Wt Readings from Last 3 Encounters:   11/19/24 85.3 kg (188 lb)   09/18/24 85.2 kg (187 lb 12.8 oz)   09/04/24 84.9 kg (187 lb 3.2 oz)       Anxiety    Hypertension  Associated symptoms include anxiety.    Presents to the clinic for management of anxiety and htn. He recently had a switch on his job and has had a significant amount of stress since he had this transition in the job. He has had raise in blood pressure and has palpitations intermittently. He wants to work but is worried about his job secondary to the intermittent anxiety that he has due to his work. He has had a cardiac workup in the past  and this was ok.     The following portions of the patient's history were reviewed and updated as appropriate: allergies, current medications, past family history, past medical history, past social history, past surgical history, and problem list.    Review of Systems    Objective   Physical Exam  Constitutional:       Appearance: He is well-developed.   HENT:      Head: Normocephalic and atraumatic.   Eyes:      Conjunctiva/sclera: Conjunctivae normal.      Pupils: Pupils are equal, round, and reactive to light.   Cardiovascular:      Rate and Rhythm: Normal rate and regular rhythm.      Heart sounds: No murmur heard.  Pulmonary:      Effort: Pulmonary effort is normal.      Breath sounds: Normal breath sounds.   Abdominal:      General: Bowel sounds are normal.      Palpations: Abdomen is soft.      Tenderness: There is no abdominal tenderness.   Musculoskeletal:         General: No deformity. Normal range of motion. "      Cervical back: Normal range of motion and neck supple.   Lymphadenopathy:      Cervical: No cervical adenopathy.   Skin:     General: Skin is warm and dry.      Capillary Refill: Capillary refill takes less than 2 seconds.      Findings: No rash.   Neurological:      Mental Status: He is alert and oriented to person, place, and time.      Cranial Nerves: No cranial nerve deficit.      Coordination: Coordination normal.      Gait: Gait normal.   Psychiatric:         Behavior: Behavior normal.         Thought Content: Thought content normal.         Judgment: Judgment normal.           Diagnoses and all orders for this visit:    1. OLIVIER (generalized anxiety disorder) (Primary)  Comments:  start pristiq    2. Elevated blood pressure reading  Comments:  likely 2/2 stress. we will treat the stress and see if it comes down.    3. Tobacco dependence due to cigarettes    Other orders  -     desvenlafaxine (Pristiq) 50 MG 24 hr tablet; Take 1 tablet by mouth Daily.  Dispense: 90 tablet; Refill: 3     We will start pristiq. Hopefully will not cause sexual dysfunction like previous ssri. He is getting fmla papers but doesn't have these yet. Consider blood pressure treatment if this doesn't come down with anxiety treatment. Counseling form given.     During this visit I counseled the patient 3-5 minutes on tobacco cessation. I discussed the risks of continued tobacco abuse and offered therapy to help with cessation.       Return in about 4 weeks (around 12/17/2024), or if symptoms worsen or fail to improve, for Recheck.

## 2024-11-19 NOTE — LETTER
November 19, 2024     Patient: John Camarillo III   YOB: 1980   Date of Visit: 11/19/2024       To Whom It May Concern:    It is my medical opinion that John Camarillo may return to work on 11/20/24 .           Sincerely,        Eamon German PA-C    CC: No Recipients

## 2024-12-03 ENCOUNTER — OFFICE VISIT (OUTPATIENT)
Dept: FAMILY MEDICINE CLINIC | Facility: CLINIC | Age: 44
End: 2024-12-03
Payer: COMMERCIAL

## 2024-12-03 VITALS
SYSTOLIC BLOOD PRESSURE: 132 MMHG | DIASTOLIC BLOOD PRESSURE: 84 MMHG | OXYGEN SATURATION: 99 % | WEIGHT: 190.4 LBS | RESPIRATION RATE: 14 BRPM | HEART RATE: 86 BPM | BODY MASS INDEX: 28.2 KG/M2 | HEIGHT: 69 IN

## 2024-12-03 DIAGNOSIS — F41.1 GAD (GENERALIZED ANXIETY DISORDER): Primary | ICD-10-CM

## 2024-12-03 PROCEDURE — 99213 OFFICE O/P EST LOW 20 MIN: CPT | Performed by: PHYSICIAN ASSISTANT

## 2024-12-03 NOTE — PROGRESS NOTES
"Jaz Camarillo III is a 44 y.o. male.     Chief Complaint   Patient presents with    Anxiety     FMLA forms       /84 (BP Location: Right arm, Patient Position: Sitting, Cuff Size: Adult)   Pulse 86   Resp 14   Ht 175.3 cm (69\")   Wt 86.4 kg (190 lb 6.4 oz)   SpO2 99%   BMI 28.12 kg/m²     BP Readings from Last 3 Encounters:   12/26/24 112/77   12/17/24 118/70   12/03/24 132/84       Wt Readings from Last 3 Encounters:   12/26/24 88.4 kg (194 lb 12.8 oz)   12/17/24 87.6 kg (193 lb 1.6 oz)   12/03/24 86.4 kg (190 lb 6.4 oz)       HPI Presents to the clinic to discuss anxiety. Has worsening anxiety as of late. There are a couple confounding factors including job changes and also some home/past apsects. He did not do well on pristiq for sharad so we had a delay in treatment. He is currently looking for a therapist to work with as well. He can't take ssri due to sexual dysfunction. No si or hi.     The following portions of the patient's history were reviewed and updated as appropriate: allergies, current medications, past family history, past medical history, past social history, past surgical history, and problem list.    Review of Systems    Objective   Physical Exam  Constitutional:       Appearance: Normal appearance.   Eyes:      Extraocular Movements: Extraocular movements intact.      Pupils: Pupils are equal, round, and reactive to light.   Neurological:      General: No focal deficit present.      Mental Status: He is alert and oriented to person, place, and time.   Psychiatric:         Mood and Affect: Mood normal.         Behavior: Behavior normal.           Diagnoses and all orders for this visit:    1. SHARAD (generalized anxiety disorder) (Primary)    Trial of trintellix. Failed ssri and snri. Therapy list provided again. Fmla forms filled out. Follow up in 1 month.     Return in about 4 weeks (around 12/31/2024), or if symptoms worsen or fail to improve, for Recheck.  "

## 2024-12-11 ENCOUNTER — TELEPHONE (OUTPATIENT)
Dept: FAMILY MEDICINE CLINIC | Facility: CLINIC | Age: 44
End: 2024-12-11

## 2024-12-11 RX ORDER — LORATADINE AND PSEUDOEPHEDRINE SULFATE 5; 120 MG/1; MG/1
1 TABLET, EXTENDED RELEASE ORAL 2 TIMES DAILY
COMMUNITY

## 2024-12-11 NOTE — TELEPHONE ENCOUNTER
Called and advised that we have not received fax as of 9591 12/11/24. Stated he would bring up physical copy.

## 2024-12-11 NOTE — TELEPHONE ENCOUNTER
"  Caller: John Camarillo III \"Gregoria\"    Relationship: Self    Best call back number: [phone]    What is the best time to reach you: ANYTIME    Who are you requesting to speak with (clinical staff, provider,  specific staff member): CLINICAL    What was the call regarding: PATIENT STATES THAT HIM AND DR. CARTER WORKED ON FMLA PAPERWORK AT 12/3 APPOINTMENT. THE PAPER WORK WAS RETURNED TO HIM BY MAIL TODAY 12/11 SAYING THE PAPERWORK WAS MISSING SOME INFO AND THEY'RE REQUESTING IT BE FILLED OUT ALL THE WAY AND SENT BACK BY THE 15TH. THE PAPER SAID A COPY OF IT THAT NEEDED TO BE FILLED OUT WAS FAXED TO THE OFFICE AS WELL. HE IS CALLING TO MAKE SURE IT WAS RECEIVED AND THAT ITS BEING WORKED ON SO HE CAN HAVE IT TURNED IN ON TIME    PLEASE CALL TO CONFIRM OR DENY           "

## 2024-12-12 ENCOUNTER — TELEPHONE (OUTPATIENT)
Dept: FAMILY MEDICINE CLINIC | Facility: CLINIC | Age: 44
End: 2024-12-12

## 2024-12-12 NOTE — TELEPHONE ENCOUNTER
DELICIA CAME BY THE OFFICE TODAY AND SAID THAT HE WAS SEEN ON 12/3 AND THERE WAS [facility] PAPERS FILLED OUT FOR [email or web address]. THEY ARE SAYING THAT THERE WAS SOME MISSING INFORMATION AND SENT HIM THE PAPER FOR THE ADDITIONAL INFORMATION. DELICIA ASK THAT THE NEW PAPERWORK GET FAXED OVER TO [email or web address]. PUT IN MA BOX IN THE FAX ROOM

## 2024-12-17 ENCOUNTER — OFFICE VISIT (OUTPATIENT)
Dept: FAMILY MEDICINE CLINIC | Facility: CLINIC | Age: 44
End: 2024-12-17
Payer: COMMERCIAL

## 2024-12-17 VITALS
OXYGEN SATURATION: 95 % | HEART RATE: 112 BPM | DIASTOLIC BLOOD PRESSURE: 70 MMHG | WEIGHT: 193.1 LBS | BODY MASS INDEX: 28.6 KG/M2 | HEIGHT: 69 IN | RESPIRATION RATE: 16 BRPM | SYSTOLIC BLOOD PRESSURE: 118 MMHG

## 2024-12-17 DIAGNOSIS — F41.1 GAD (GENERALIZED ANXIETY DISORDER): Primary | ICD-10-CM

## 2024-12-17 PROCEDURE — 99213 OFFICE O/P EST LOW 20 MIN: CPT | Performed by: PHYSICIAN ASSISTANT

## 2024-12-17 NOTE — PROGRESS NOTES
"Jaz Camarillo III is a 44 y.o. male.     Chief Complaint   Patient presents with    Anxiety     F/u for fmla       /70 (BP Location: Left arm, Patient Position: Sitting, Cuff Size: Adult)   Pulse 112   Resp 16   Ht 175.3 cm (69\")   Wt 87.6 kg (193 lb 1.6 oz)   SpO2 95%   BMI 28.52 kg/m²     BP Readings from Last 3 Encounters:   12/17/24 118/70   12/03/24 132/84   11/19/24 138/72       Wt Readings from Last 3 Encounters:   12/17/24 87.6 kg (193 lb 1.6 oz)   12/03/24 86.4 kg (190 lb 6.4 oz)   11/19/24 85.3 kg (188 lb)       Anxiety     Presents to the clinic for follow up on anxiety. He has been struggling recently with more anxiety. We started him on pristiq and he could not tolerate this medication. He felt very hyped up and amped on the medication. We did fill out fmla paperwork for him and this was sent back and we adjusted it as requested. He hasn't heard anything about this yet. Denies si or hi.     The following portions of the patient's history were reviewed and updated as appropriate: allergies, current medications, past family history, past medical history, past social history, past surgical history, and problem list.    Review of Systems    Objective   Physical Exam  Constitutional:       Appearance: Normal appearance.   Eyes:      Extraocular Movements: Extraocular movements intact.      Pupils: Pupils are equal, round, and reactive to light.   Neurological:      General: No focal deficit present.      Mental Status: He is alert and oriented to person, place, and time.   Psychiatric:         Mood and Affect: Mood normal.         Behavior: Behavior normal.           Diagnoses and all orders for this visit:    1. OLIVIER (generalized anxiety disorder) (Primary)  Comments:  has failed sertraline and pristiq. we will put him on trintellix. if this fails we will consider gene sight testing and/or psychiatry.    Other orders  -     Vortioxetine HBr (TRINTELLIX) 10 MG tablet tablet; Take 1 " tablet by mouth Daily With Breakfast.  Dispense: 30 tablet; Refill: 5        Return in about 4 weeks (around 1/14/2025), or if symptoms worsen or fail to improve, for Recheck.

## 2024-12-24 ENCOUNTER — ANESTHESIA EVENT (OUTPATIENT)
Dept: GASTROENTEROLOGY | Facility: HOSPITAL | Age: 44
End: 2024-12-24
Payer: COMMERCIAL

## 2024-12-24 RX ORDER — SODIUM CHLORIDE 9 MG/ML
9 INJECTION, SOLUTION INTRAVENOUS CONTINUOUS PRN
Status: CANCELLED | OUTPATIENT
Start: 2024-12-24 | End: 2024-12-25

## 2024-12-24 RX ORDER — SODIUM CHLORIDE 0.9 % (FLUSH) 0.9 %
10 SYRINGE (ML) INJECTION EVERY 12 HOURS SCHEDULED
Status: CANCELLED | OUTPATIENT
Start: 2024-12-24

## 2024-12-24 RX ORDER — SODIUM CHLORIDE 0.9 % (FLUSH) 0.9 %
10 SYRINGE (ML) INJECTION AS NEEDED
Status: CANCELLED | OUTPATIENT
Start: 2024-12-24

## 2024-12-24 NOTE — ANESTHESIA PREPROCEDURE EVALUATION
Anesthesia Evaluation     Patient summary reviewed and Nursing notes reviewed   no history of anesthetic complications:   NPO Solid Status: > 8 hours  NPO Liquid Status: > 2 hours           Airway   Dental      Pulmonary    (+) a smoker Current,  Cardiovascular     ECG reviewed    (+) BRANDT      Neuro/Psych  (+) dizziness/light headedness, psychiatric history Anxiety  GI/Hepatic/Renal/Endo    (+) hiatal hernia, GERD, PUD    Musculoskeletal     Abdominal    Substance History   (+) alcohol use     OB/GYN          Other        ROS/Med Hx Other: Additional History:  Allergies, vertigo, abd pain, IBS, Orozco's esophagus    Echo:  Echocardiogram Findings    Left Ventricle Left ventricular systolic function is normal. Left ventricular ejection fraction appears to be 56 - 60%.   Global longitudinal LV strain (GLS) = -15.9%. Left ventricle strain data was reviewed by the physician and found to be accurate. Normal left ventricular cavity size and wall thickness noted. All left ventricular wall segments contract normally. Left ventricular diastolic function was normal. Normal left atrial pressure. No evidence of left ventricular thrombus or mass present.  Right Ventricle Normal right ventricular cavity size and systolic function noted.  Left Atrium Normal left atrial cavity size noted.  Right Atrium Normal right atrial cavity size noted.  Aortic Valve The aortic valve is structurally normal. No significant aortic valve regurgitation is present. No hemodynamically significant aortic valve stenosis is present.  Mitral Valve The mitral valve is structurally normal with no significant stenosis present. No significant mitral valve regurgitation is present.  Tricuspid Valve The tricuspid valve is structurally normal with no significant regurgitation or significant stenosis present.  Pulmonic Valve The pulmonic valve is not well visualized.  Greater Vessels No dilation of the aortic root is present.  Pericardium There is no evidence  of pericardial effusion. .        Stress Test:  ·  No ECG evidence of myocardial ischemia.  ·  Negative clinical evidence of myocardial ischemia.  ·  Findings consistent with a normal ECG stress test.  ·  Patient walked for total of 9: 33 minutes and achieved workload of 10.1 METS        PSH:  COLONOSCOPY EYE SURGERY  CHOLECYSTECTOMY KNEE ACL RECONSTRUCTION  ENDOSCOPY ENDOSCOPY                Anesthesia Plan    ASA 3     general   total IV anesthesia  (Patient identified; pre-operative vital signs, all relevant labs/studies, complete medical/surgical/anesthetic history, full medication list, full allergy list, and NPO status obtained/reviewed; physical assessment performed; anesthetic options, side effects, potential complications, risks, and benefits discussed; questions answered; written anesthesia consent obtained; patient cleared for procedure; anesthesia machine and equipment checked and functioning)  intravenous induction     Anesthetic plan, risks, benefits, and alternatives have been provided, discussed and informed consent has been obtained with: patient.    Plan discussed with CRNA and CAA.    CODE STATUS:

## 2024-12-25 NOTE — H&P
GI CONSULT  NOTE:    Referring Provider:    Eamon German PA-C Matthew David McColloug*    Chief complaint: Orozco's esophagus without dysplasia    History of present illness:      John Camarillo III is a 44 y.o. male who presents today for Procedure(s):  ESOPHAGOGASTRODUODENOSCOPY AND BARRX PROCEDURE WITH RADIO FREQUENCY ABLATION VERSUS BIOPSY for the indications listed below.     The updated Patient Profile was reviewed prior to the procedure, in conjunction with the Physical Exam, including medical conditions, surgical procedures, medications, allergies, family history and social history.     Pre-operatively, I reviewed the indication(s) for the procedure, the risks of the procedure [including but not limited to: unexpected bleeding possibly requiring hospitalization and/or unplanned repeat procedures, perforation possibly requiring surgical treatment, missed lesions and complications of sedation/MAC (also explained by anesthesia staff)].     I have evaluated the patient for risks associated with the planned anesthesia and the procedure to be performed and find the patient an acceptable candidate for IV sedation.    Multiple opportunities were provided for any questions or concerns, and all questions were answered satisfactorily before any anesthesia was administered. We will proceed with the planned procedure.    Past Medical History:  Past Medical History:   Diagnosis Date    Allergic     Anxiety     Orozco esophagus     GERD (gastroesophageal reflux disease)     Hiatal hernia     Irritable bowel syndrome     Peptic ulceration     S/P surgery for recurrent dislocation of shoulder 1998    Smoker 1998       Past Surgical History:  Past Surgical History:   Procedure Laterality Date    CHOLECYSTECTOMY      COLONOSCOPY      ENDOSCOPY N/A 3/8/2024    Procedure: ESOPHAGOGASTRODUODENOSCOPY AND RADIOFREQUENCY ABLATION X2;  Surgeon: Mike Forrest MD;  Location: [facility] ENDOSCOPY;  Service:  "Gastroenterology;  Laterality: N/A;  esophageal stricture, curtis's esophagus    ENDOSCOPY N/A 9/18/2024    Procedure: ESOPHAGOGASTRODUODENOSCOPY AND BARRX PROCEDURE;  Surgeon: Mike Forrest MD;  Location: [facility] ENDOSCOPY;  Service: Gastroenterology;  Laterality: N/A;    EYE SURGERY      KNEE ACL RECONSTRUCTION Right 2015       Social History:  Social History     Tobacco Use    Smoking status: Every Day     Current packs/day: 0.50     Average packs/day: 0.5 packs/day for 27.0 years (13.5 ttl pk-yrs)     Types: Cigarettes     Start date: 1998     Passive exposure: Current    Smokeless tobacco: Never   Vaping Use    Vaping status: Never Used    Passive vaping exposure: Yes   Substance Use Topics    Alcohol use: Yes     Alcohol/week: 5.0 standard drinks of alcohol     Types: 5 Cans of beer per week     Comment: rarely    Drug use: Never       Family History:  Family History   Problem Relation Age of Onset    Hypertension Mother     Hypertension Father     Stomach cancer Maternal Grandmother     Lung cancer Maternal Grandmother     Lung cancer Maternal Grandfather        Medications:  No medications prior to admission.       Scheduled Meds:  Continuous Infusions:No current facility-administered medications for this encounter.    PRN Meds:.    ALLERGIES:  Patient has no known allergies.    ROS:  The following systems were reviewed and negative;   Constitution:  No fevers, chills, no unintentional weight loss  Skin: no rash, no jaundice  Eyes:  No blurry vision, no eye pain  HENT:  No change in hearing or smell  Resp:  No dyspnea or cough  CV:  No chest pain or palpitations  :  No dysuria, hematuria  Musculoskeletal:  No leg cramps or arthralgias  Neuro:  No tremor, no numbness  Psych:  No depression or confusion    Objective     Vital Signs:   Vitals:    12/11/24 1229   Weight: 81.6 kg (180 lb)   Height: 175.3 cm (69\")       Physical Exam:       General Appearance:    Awake and alert, in no acute " distress   Head:    Normocephalic, without obvious abnormality, atraumatic   Throat:   No oral lesions, no thrush, oral mucosa moist   Lungs:     respirations regular, even and unlabored   Skin:   No rash, no jaundice       Results Review:  Lab Results (last 24 hours)       ** No results found for the last 24 hours. **            Imaging Results (Last 24 Hours)       ** No results found for the last 24 hours. **             I reviewed the patient's labs and imaging.    ASSESSMENT AND PLAN:      Principal Problem:    Orozco's esophagus without dysplasia       Procedure(s):  ESOPHAGOGASTRODUODENOSCOPY AND BARRX PROCEDURE WITH RADIO FREQUENCY ABLATION VERSUS BIOPSY      I discussed the patients findings and my recommendations with the patient.    Electronically signed by Mike Forrest MD, 12/25/24, 3:30 PM EST.

## 2024-12-26 ENCOUNTER — HOSPITAL ENCOUNTER (OUTPATIENT)
Facility: HOSPITAL | Age: 44
Setting detail: HOSPITAL OUTPATIENT SURGERY
Discharge: HOME | End: 2024-12-26
Attending: INTERNAL MEDICINE | Admitting: INTERNAL MEDICINE
Payer: COMMERCIAL

## 2024-12-26 ENCOUNTER — ANESTHESIA (OUTPATIENT)
Dept: GASTROENTEROLOGY | Facility: HOSPITAL | Age: 44
End: 2024-12-26
Payer: COMMERCIAL

## 2024-12-26 ENCOUNTER — ON CAMPUS - OUTPATIENT (AMBULATORY)
Dept: URBAN - METROPOLITAN AREA HOSPITAL 85 | Facility: HOSPITAL | Age: 44
End: 2024-12-26
Payer: COMMERCIAL

## 2024-12-26 VITALS
RESPIRATION RATE: 15 BRPM | BODY MASS INDEX: 28.85 KG/M2 | DIASTOLIC BLOOD PRESSURE: 77 MMHG | WEIGHT: 194.8 LBS | HEIGHT: 69 IN | SYSTOLIC BLOOD PRESSURE: 112 MMHG | OXYGEN SATURATION: 95 % | TEMPERATURE: 98.1 F | HEART RATE: 92 BPM

## 2024-12-26 DIAGNOSIS — K22.70 BARRETT'S ESOPHAGUS WITHOUT DYSPLASIA: ICD-10-CM

## 2024-12-26 DIAGNOSIS — K44.9 DIAPHRAGMATIC HERNIA WITHOUT OBSTRUCTION OR GANGRENE: ICD-10-CM

## 2024-12-26 DIAGNOSIS — K20.0 EOSINOPHILIC ESOPHAGITIS: ICD-10-CM

## 2024-12-26 PROCEDURE — 25010000002 ACETYLCYSTEINE PER 100 MG: Performed by: INTERNAL MEDICINE

## 2024-12-26 PROCEDURE — 43270 EGD LESION ABLATION: CPT | Performed by: INTERNAL MEDICINE

## 2024-12-26 PROCEDURE — 25010000002 LIDOCAINE 2% SOLUTION: Performed by: NURSE ANESTHETIST, CERTIFIED REGISTERED

## 2024-12-26 PROCEDURE — C1733 CATH, EP, OTHR THAN COOL-TIP: HCPCS | Performed by: INTERNAL MEDICINE

## 2024-12-26 PROCEDURE — 25010000002 PROPOFOL 200 MG/20ML EMULSION: Performed by: NURSE ANESTHETIST, CERTIFIED REGISTERED

## 2024-12-26 PROCEDURE — 25810000003 SODIUM CHLORIDE 0.9 % SOLUTION: Performed by: NURSE ANESTHETIST, CERTIFIED REGISTERED

## 2024-12-26 PROCEDURE — 25010000002 FENTANYL CITRATE (PF) 100 MCG/2ML SOLUTION: Performed by: NURSE ANESTHETIST, CERTIFIED REGISTERED

## 2024-12-26 PROCEDURE — 25010000002 GLYCOPYRROLATE 0.2 MG/ML SOLUTION: Performed by: NURSE ANESTHETIST, CERTIFIED REGISTERED

## 2024-12-26 RX ORDER — GLYCOPYRROLATE 0.2 MG/ML
INJECTION INTRAMUSCULAR; INTRAVENOUS AS NEEDED
Status: DISCONTINUED | OUTPATIENT
Start: 2024-12-26 | End: 2024-12-26 | Stop reason: SURG

## 2024-12-26 RX ORDER — EPHEDRINE SULFATE 5 MG/ML
5 INJECTION INTRAVENOUS ONCE AS NEEDED
Status: DISCONTINUED | OUTPATIENT
Start: 2024-12-26 | End: 2024-12-26 | Stop reason: HOSPADM

## 2024-12-26 RX ORDER — SODIUM CHLORIDE 9 MG/ML
INJECTION, SOLUTION INTRAVENOUS CONTINUOUS PRN
Status: DISCONTINUED | OUTPATIENT
Start: 2024-12-26 | End: 2024-12-26 | Stop reason: SURG

## 2024-12-26 RX ORDER — DIPHENHYDRAMINE HYDROCHLORIDE 50 MG/ML
12.5 INJECTION INTRAMUSCULAR; INTRAVENOUS
Status: DISCONTINUED | OUTPATIENT
Start: 2024-12-26 | End: 2024-12-26 | Stop reason: HOSPADM

## 2024-12-26 RX ORDER — LIDOCAINE HYDROCHLORIDE 20 MG/ML
INJECTION, SOLUTION INFILTRATION; PERINEURAL AS NEEDED
Status: DISCONTINUED | OUTPATIENT
Start: 2024-12-26 | End: 2024-12-26 | Stop reason: SURG

## 2024-12-26 RX ORDER — IPRATROPIUM BROMIDE AND ALBUTEROL SULFATE 2.5; .5 MG/3ML; MG/3ML
3 SOLUTION RESPIRATORY (INHALATION) ONCE AS NEEDED
Status: DISCONTINUED | OUTPATIENT
Start: 2024-12-26 | End: 2024-12-26 | Stop reason: HOSPADM

## 2024-12-26 RX ORDER — FENTANYL CITRATE 50 UG/ML
INJECTION, SOLUTION INTRAMUSCULAR; INTRAVENOUS AS NEEDED
Status: DISCONTINUED | OUTPATIENT
Start: 2024-12-26 | End: 2024-12-26 | Stop reason: SURG

## 2024-12-26 RX ORDER — HYDRALAZINE HYDROCHLORIDE 20 MG/ML
5 INJECTION INTRAMUSCULAR; INTRAVENOUS
Status: DISCONTINUED | OUTPATIENT
Start: 2024-12-26 | End: 2024-12-26 | Stop reason: HOSPADM

## 2024-12-26 RX ORDER — ONDANSETRON 2 MG/ML
4 INJECTION INTRAMUSCULAR; INTRAVENOUS ONCE AS NEEDED
Status: DISCONTINUED | OUTPATIENT
Start: 2024-12-26 | End: 2024-12-26 | Stop reason: HOSPADM

## 2024-12-26 RX ORDER — LABETALOL HYDROCHLORIDE 5 MG/ML
5 INJECTION, SOLUTION INTRAVENOUS
Status: DISCONTINUED | OUTPATIENT
Start: 2024-12-26 | End: 2024-12-26 | Stop reason: HOSPADM

## 2024-12-26 RX ORDER — DEXMEDETOMIDINE HYDROCHLORIDE 100 UG/ML
INJECTION, SOLUTION INTRAVENOUS AS NEEDED
Status: DISCONTINUED | OUTPATIENT
Start: 2024-12-26 | End: 2024-12-26 | Stop reason: SURG

## 2024-12-26 RX ORDER — PROPOFOL 10 MG/ML
INJECTION, EMULSION INTRAVENOUS AS NEEDED
Status: DISCONTINUED | OUTPATIENT
Start: 2024-12-26 | End: 2024-12-26 | Stop reason: SURG

## 2024-12-26 RX ADMIN — PROPOFOL 50 MG: 10 INJECTION, EMULSION INTRAVENOUS at 09:23

## 2024-12-26 RX ADMIN — PROPOFOL 50 MG: 10 INJECTION, EMULSION INTRAVENOUS at 09:26

## 2024-12-26 RX ADMIN — PROPOFOL 50 MG: 10 INJECTION, EMULSION INTRAVENOUS at 09:29

## 2024-12-26 RX ADMIN — DEXMEDETOMIDINE HYDROCHLORIDE 4 MCG: 100 INJECTION, SOLUTION INTRAVENOUS at 09:26

## 2024-12-26 RX ADMIN — FENTANYL CITRATE 50 MCG: 50 INJECTION, SOLUTION INTRAMUSCULAR; INTRAVENOUS at 09:27

## 2024-12-26 RX ADMIN — PROPOFOL 50 MG: 10 INJECTION, EMULSION INTRAVENOUS at 09:31

## 2024-12-26 RX ADMIN — DEXMEDETOMIDINE HYDROCHLORIDE 6 MCG: 100 INJECTION, SOLUTION INTRAVENOUS at 09:23

## 2024-12-26 RX ADMIN — FENTANYL CITRATE 50 MCG: 50 INJECTION, SOLUTION INTRAMUSCULAR; INTRAVENOUS at 09:34

## 2024-12-26 RX ADMIN — LIDOCAINE HYDROCHLORIDE 100 MG: 20 INJECTION, SOLUTION INFILTRATION; PERINEURAL at 09:23

## 2024-12-26 RX ADMIN — GLYCOPYRROLATE 0.2 MG: 0.2 INJECTION, SOLUTION INTRAMUSCULAR; INTRAVENOUS at 09:21

## 2024-12-26 RX ADMIN — SODIUM CHLORIDE: 9 INJECTION, SOLUTION INTRAVENOUS at 09:21

## 2024-12-26 RX ADMIN — PROPOFOL 50 MG: 10 INJECTION, EMULSION INTRAVENOUS at 09:34

## 2024-12-26 RX ADMIN — PROPOFOL 100 MG: 10 INJECTION, EMULSION INTRAVENOUS at 09:24

## 2024-12-26 NOTE — OP NOTE
ESOPHAGOGASTRODUODENOSCOPY AND BARRX PROCEDURE Procedure Report    Patient Name:  John Huntern GRETCHEN  YOB: 1980    Date of Surgery:  12/26/2024     Preoperative diagnosis:  Orozco's esophagus without dysplasia    Postoperative diagnosis:  Orozco's esophagus without dysplasia  Eosinophilic esophagitis  Hiatal hernia         Procedure(s):  ESOPHAGOGASTRODUODENOSCOPY with radiofrequency ablation (20 ablations)     Staff:  Surgeon(s):  Mike Forrest MD      Anesthesia: Monitored Anesthesia Care    DNR status: Patient wishes full code during the procedure    Implants:    Nothing was implanted during the procedure    Specimen:        See Below    Estimated blood loss: Minimal     Complications:  None    Description of Procedure:  Informed consent was obtained for the procedure, including sedation.  Risks of perforation, hemorrhage, adverse drug reaction and aspiration were discussed.  The patient was brought into the endoscopy suite. Continuous cardiopulmonary monitoring was performed. The patient was placed in the left lateral decubitus position.  The bite block was inserted into the patient's mouth. After adequate sedation was attained, the Olympus gastroscope was inserted into the patient's mouth and advanced to the second portion of the duodenum without difficulty.  Circumferential examination was performed. A retroflex exam was performed in the patient's stomach.  On completion of the exam, the bowel was decompressed, the scope was removed from the patient, the patient tolerated the procedure well, there were no immediate post-operative complications.     Examination of the esophagus:   Islands of salmon-colored mucosa were noted in distal esophagus.    Mucosal regularity: None  Top of salmon colored mucosa: 31  Top of the gastric folds: 35  Diaphragmatic hiatus 38  Walden classification C 0 M 4, original classification C3M4.  Concentric rings throughout the esophagus consistent with  known eosinophilic esophagitis    Examination of the stomach: Normal  Examination of the duodenum: Normal    Description of radiofrequency ablation procedure:    The distal esophagus was irrigated with a solution of Mucomyst.  The 20 mm x 13 mm focal ablation catheter was attached to the tip of the scope and brought to the GE junction.  Orozco's mucosa was ablated in the usual fashion with 12 J/cm2.  The coagulum was removed and the treatment was performed a second time.  A total of 20 ablations were performed.  Patient tolerated procedure well.       Impression:  EGD shows approximately 85% resolution of Orozco's mucosa replaced with normal squamous tissue.  The remaining Orozco's tissue was ablated in the usual fashion.  This was his third treatment.    Recommendations:  Monitor for postoperative palpitations  Continue twice daily proton pump inhibitor  Repeat EGD with radiofrequency ablation versus biopsy in 3 months  Hydrocodone liquid x 3 days as needed for pain    We appreciate the referral    Electronically signed by Mike Forrest MD, 12/26/24, 9:40 AM EST.

## 2024-12-26 NOTE — ANESTHESIA POSTPROCEDURE EVALUATION
Patient: John Andradeehn III    Procedure Summary       Date: 12/26/24 Room / Location: [facility] ENDOSCOPY 1 / [facility] ENDOSCOPY    Anesthesia Start: 0921 Anesthesia Stop: 0940    Procedure: ESOPHAGOGASTRODUODENOSCOPY with radiofrequency ablation (20 ablations) Diagnosis:       Orozco's esophagus without dysplasia      (Orozco's esophagus without dysplasia [K22.70])    Surgeons: Mike Forrest MD Provider: Garth Abel MD    Anesthesia Type: general ASA Status: 3            Anesthesia Type: general    Vitals  Vitals Value Taken Time   /81 12/26/24 1011   Temp     Pulse 79 12/26/24 1014   Resp 15 12/26/24 1009   SpO2 94 % 12/26/24 1014   Vitals shown include unfiled device data.        Post Anesthesia Care and Evaluation    Patient location during evaluation: PACU  Patient participation: complete - patient participated  Level of consciousness: awake  Pain scale: See nurse's notes for pain score.  Pain management: adequate    Airway patency: patent  Anesthetic complications: No anesthetic complications  PONV Status: none  Cardiovascular status: acceptable  Respiratory status: acceptable and spontaneous ventilation  Hydration status: acceptable    Comments: Patient seen and examined postoperatively; vital signs stable; SpO2 greater than or equal to 90%; cardiopulmonary status stable; nausea/vomiting adequately controlled; pain adequately controlled; no apparent anesthesia complications; patient discharged from anesthesia care when discharge criteria were met

## 2024-12-26 NOTE — DISCHARGE INSTRUCTIONS
A responsible adult should stay with you and you should rest quietly for the rest of the day.    Do not drink alcohol, drive, operate any heavy machinery or power tools or make any legal/important decisions for the next 24 hours.     Progress your diet as tolerated.  If you begin to experience severe pain, increased shortness of breath, racing heartbeat or a fever above 101 F, seek immediate medical attention.     Follow up with MD as instructed. Call office for results in 3 to 5 days if needed.    For further questions please call Dr. Forrest's office at [phone]    Recommendations:  Monitor for postoperative palpitations  Continue twice daily proton pump inhibitor  Repeat EGD with radiofrequency ablation versus biopsy in 3 months  Hydrocodone liquid x 3 days as needed for pain

## 2025-01-15 ENCOUNTER — OFFICE VISIT (OUTPATIENT)
Dept: FAMILY MEDICINE CLINIC | Facility: CLINIC | Age: 45
End: 2025-01-15
Payer: COMMERCIAL

## 2025-01-15 VITALS
HEART RATE: 88 BPM | RESPIRATION RATE: 18 BRPM | HEIGHT: 69 IN | BODY MASS INDEX: 29.47 KG/M2 | DIASTOLIC BLOOD PRESSURE: 84 MMHG | WEIGHT: 199 LBS | OXYGEN SATURATION: 95 % | SYSTOLIC BLOOD PRESSURE: 122 MMHG

## 2025-01-15 DIAGNOSIS — A08.4 VIRAL GASTROENTERITIS: Primary | ICD-10-CM

## 2025-01-15 PROCEDURE — 99213 OFFICE O/P EST LOW 20 MIN: CPT | Performed by: STUDENT IN AN ORGANIZED HEALTH CARE EDUCATION/TRAINING PROGRAM

## 2025-01-15 RX ORDER — HYDROCODONE BITARTRATE AND ACETAMINOPHEN 7.5; 325 MG/15ML; MG/15ML
SOLUTION ORAL
COMMUNITY
Start: 2024-12-26 | End: 2025-01-15

## 2025-01-15 NOTE — PROGRESS NOTES
"Chief Complaint  Chief Complaint   Patient presents with    Nausea    Diarrhea       Subjective        John Camarillo III is a 44 y.o. male who presents to [facility].  History of Present Illness    Gregoria is a 44-year-old male here for nausea and diarrhea.    Patient states he woke up around 0100 this morning with nausea which has persisted since then.  Denies any vomiting.  He woke up around 0300 with diarrhea.  Has had 3 bowel movements since then-all of them are watery without any blood.  Denies any sick contacts.  Denies any symptoms yesterday.  Denies any fevers, cough, sore throat.  Notes that he has had some mild congestion the last few days as well.            Objective   /84   Pulse 88   Resp 18   Ht 175.3 cm (69\")   Wt 90.3 kg (199 lb)   SpO2 95%   BMI 29.39 kg/m²     Estimated body mass index is 29.39 kg/m² as calculated from the following:    Height as of this encounter: 175.3 cm (69\").    Weight as of this encounter: 90.3 kg (199 lb).     Physical Exam   GEN: In no acute distress, non toxic appearing  HEENT: EOMI. Mucous membranes moist. Oropharynx without erythema or exudate.  TM normal in appearance bilaterally.  CV: Regular rate and rhythm, no murmurs. No extremity edema.   RESP: Lungs clear to auscultation bilaterally.  No signs of respiratory distress on room air.  SKIN: No rashes  MSK: No joint erythema, deformity, or effusion.   NEURO: Alert and appropriate. CN 2-12 intact grossly.       PHQ-2 Depression Screening  Little interest or pleasure in doing things? Not at all   Feeling down, depressed, or hopeless? Not at all   PHQ-2 Total Score 0         Result Review :              Assessment and Plan     Diagnoses and all orders for this visit:    1. Viral gastroenteritis (Primary)  History and exam is consistent with viral gastroenteritis.  Recommend pushing p.o. fluids and plenty of rest.  Recommend frequent handwashing in order to decrease spread of " illness.    I did offer prescription for nausea medication, but patient states he has old prescription from a few months ago at home and can take that if he needs to.    Advised to call the office if symptoms not improving after 5 days.          Follow Up     No follow-ups on file.

## 2025-01-24 ENCOUNTER — OFFICE VISIT (OUTPATIENT)
Dept: FAMILY MEDICINE CLINIC | Facility: CLINIC | Age: 45
End: 2025-01-24
Payer: COMMERCIAL

## 2025-01-24 VITALS
DIASTOLIC BLOOD PRESSURE: 76 MMHG | OXYGEN SATURATION: 98 % | HEART RATE: 95 BPM | BODY MASS INDEX: 29.52 KG/M2 | RESPIRATION RATE: 16 BRPM | WEIGHT: 199.3 LBS | SYSTOLIC BLOOD PRESSURE: 130 MMHG | HEIGHT: 69 IN

## 2025-01-24 DIAGNOSIS — F41.1 GAD (GENERALIZED ANXIETY DISORDER): Primary | ICD-10-CM

## 2025-01-24 DIAGNOSIS — M76.829 POSTERIOR TIBIALIS MUSCLE DYSFUNCTION: ICD-10-CM

## 2025-01-24 PROCEDURE — 99213 OFFICE O/P EST LOW 20 MIN: CPT | Performed by: PHYSICIAN ASSISTANT

## 2025-01-24 NOTE — PROGRESS NOTES
"Jaz Camarillo III is a 44 y.o. male.     Chief Complaint   Patient presents with    Anxiety     F/u. Having pain in R foot in ankle area around tendons. X1 month       /76 (BP Location: Right arm, Patient Position: Sitting, Cuff Size: Adult)   Pulse 95   Resp 16   Ht 175.3 cm (69\")   Wt 90.4 kg (199 lb 4.8 oz)   SpO2 98%   BMI 29.43 kg/m²     BP Readings from Last 3 Encounters:   01/24/25 130/76   01/15/25 122/84   12/26/24 112/77       Wt Readings from Last 3 Encounters:   01/24/25 90.4 kg (199 lb 4.8 oz)   01/15/25 90.3 kg (199 lb)   12/26/24 88.4 kg (194 lb 12.8 oz)       Anxiety     Presents to the anxiety and for right foot/ankle pain. About a month ago we put him on trintellix. He has done well on this. He also had a switch in his job and he is getting more regular sleep now which his helping. He has had no side effects to the medication. He is also having right foot/ankle. This has been present for about 1 month. He noticed it from deer season. He notices this when he pulls his foot up. He feels like it is a dull throbbing/tingling type pain.     The following portions of the patient's history were reviewed and updated as appropriate: allergies, current medications, past family history, past medical history, past social history, past surgical history, and problem list.    Review of Systems    Objective   Physical Exam  Constitutional:       Appearance: Normal appearance.   Eyes:      Extraocular Movements: Extraocular movements intact.      Pupils: Pupils are equal, round, and reactive to light.   Musculoskeletal:         General: Tenderness present. Normal range of motion.      Comments: Posterior tibial   Neurological:      General: No focal deficit present.      Mental Status: He is alert and oriented to person, place, and time.   Psychiatric:         Mood and Affect: Mood normal.         Behavior: Behavior normal.           Diagnoses and all orders for this visit:    1. OLIVIER " (generalized anxiety disorder) (Primary)    2. Posterior tibialis muscle dysfunction    Other orders  -     Vortioxetine HBr (Trintellix) 10 MG tablet tablet; Take 1 tablet by mouth Daily With Breakfast.  Dispense: 90 tablet; Refill: 3        No follow-ups on file.

## 2025-02-24 RX ORDER — FLUTICASONE PROPIONATE 50 MCG
2 SPRAY, SUSPENSION (ML) NASAL DAILY
Qty: 48 G | Refills: 1 | Status: SHIPPED | OUTPATIENT
Start: 2025-02-24

## 2025-02-28 ENCOUNTER — OFFICE VISIT (OUTPATIENT)
Dept: FAMILY MEDICINE CLINIC | Facility: CLINIC | Age: 45
End: 2025-02-28
Payer: COMMERCIAL

## 2025-02-28 VITALS
WEIGHT: 198.2 LBS | DIASTOLIC BLOOD PRESSURE: 84 MMHG | OXYGEN SATURATION: 99 % | RESPIRATION RATE: 16 BRPM | BODY MASS INDEX: 29.27 KG/M2 | SYSTOLIC BLOOD PRESSURE: 132 MMHG | HEART RATE: 96 BPM

## 2025-02-28 DIAGNOSIS — F41.1 GAD (GENERALIZED ANXIETY DISORDER): ICD-10-CM

## 2025-02-28 DIAGNOSIS — Z00.00 ANNUAL PHYSICAL EXAM: Primary | ICD-10-CM

## 2025-02-28 DIAGNOSIS — R53.82 CHRONIC FATIGUE: ICD-10-CM

## 2025-02-28 DIAGNOSIS — F17.210 TOBACCO DEPENDENCE DUE TO CIGARETTES: ICD-10-CM

## 2025-02-28 DIAGNOSIS — M46.1 SI JOINT ARTHRITIS: ICD-10-CM

## 2025-02-28 PROCEDURE — 99396 PREV VISIT EST AGE 40-64: CPT | Performed by: PHYSICIAN ASSISTANT

## 2025-02-28 RX ORDER — AMOXICILLIN 875 MG/1
1 TABLET, COATED ORAL EVERY 12 HOURS SCHEDULED
COMMUNITY
Start: 2025-02-24

## 2025-02-28 RX ORDER — DICLOFENAC SODIUM 75 MG/1
75 TABLET, DELAYED RELEASE ORAL 2 TIMES DAILY
Qty: 30 TABLET | Refills: 1 | Status: SHIPPED | OUTPATIENT
Start: 2025-02-28 | End: 2025-03-24

## 2025-02-28 RX ORDER — CYCLOBENZAPRINE HCL 10 MG
10 TABLET ORAL 3 TIMES DAILY PRN
Qty: 30 TABLET | Refills: 1 | Status: SHIPPED | OUTPATIENT
Start: 2025-02-28

## 2025-02-28 NOTE — PROGRESS NOTES
Jaz Camarillo III is a 44 y.o. male.     Chief Complaint   Patient presents with    Anxiety     F/u    Follow-up     1 month f/u       /84   Pulse 96   Resp 16   Wt 89.9 kg (198 lb 3.2 oz)   SpO2 99%   BMI 29.27 kg/m²     BP Readings from Last 3 Encounters:   02/28/25 132/84   01/24/25 130/76   01/15/25 122/84       Wt Readings from Last 3 Encounters:   02/28/25 89.9 kg (198 lb 3.2 oz)   01/24/25 90.4 kg (199 lb 4.8 oz)   01/15/25 90.3 kg (199 lb)       Anxiety     Presents to the clinic for annual physical exam. anxiety and lower back pain. His anxiety has been better. Trintellix was too expensive. He has had a switch in position and this has helped his anxiety but now his back is hurting. He is having lower back pain. No radiation in the legs. Has more physical job. Also curious about testosterone due to chronic fatigue.     The following portions of the patient's history were reviewed and updated as appropriate: allergies, current medications, past family history, past medical history, past social history, past surgical history, and problem list.    Review of Systems    Objective   Physical Exam  Constitutional:       Appearance: He is well-developed.   HENT:      Head: Normocephalic and atraumatic.   Eyes:      Conjunctiva/sclera: Conjunctivae normal.      Pupils: Pupils are equal, round, and reactive to light.   Cardiovascular:      Rate and Rhythm: Normal rate and regular rhythm.      Heart sounds: No murmur heard.  Pulmonary:      Effort: Pulmonary effort is normal.      Breath sounds: Normal breath sounds.   Abdominal:      General: Bowel sounds are normal.      Palpations: Abdomen is soft.      Tenderness: There is no abdominal tenderness.   Musculoskeletal:         General: No deformity. Normal range of motion.      Cervical back: Normal range of motion and neck supple.   Lymphadenopathy:      Cervical: No cervical adenopathy.   Skin:     General: Skin is warm and dry.       Capillary Refill: Capillary refill takes less than 2 seconds.      Findings: No rash.   Neurological:      Mental Status: He is alert and oriented to person, place, and time.      Cranial Nerves: No cranial nerve deficit.      Coordination: Coordination normal.      Gait: Gait normal.   Psychiatric:         Behavior: Behavior normal.         Thought Content: Thought content normal.         Judgment: Judgment normal.           Diagnoses and all orders for this visit:    1. Annual physical exam (Primary)  -     CBC w AUTO Differential; Future  -     Comprehensive metabolic panel; Future  -     Lipid panel; Future  -     TSH Rfx On Abnormal To Free T4; Future  -     Vitamin B12; Future  -     Folate; Future    2. Chronic fatigue  -     Testosterone; Future  -     TSH Rfx On Abnormal To Free T4; Future  -     Vitamin B12; Future  -     Folate; Future    3. OLIVIER (generalized anxiety disorder)    4. SI joint arthritis    5. Tobacco dependence due to cigarettes    Other orders  -     cyclobenzaprine (FLEXERIL) 10 MG tablet; Take 1 tablet by mouth 3 (Three) Times a Day As Needed for Muscle Spasms.  Dispense: 30 tablet; Refill: 1  -     diclofenac (VOLTAREN) 75 MG EC tablet; Take 1 tablet by mouth 2 (Two) Times a Day.  Dispense: 30 tablet; Refill: 1      Healthy eating habits. Low saturated fats. High plant based. Avoid processed foods. 15-30 min of cardiovascular exercise 5 days per week with atleast 2-3 days of resistance training.     During this visit I counseled the patient 3-5 minutes on tobacco cessation. I discussed the risks of continued tobacco abuse and offered therapy to help with cessation.       Return in about 6 months (around 8/28/2025) for Recheck.

## 2025-03-07 ENCOUNTER — LAB (OUTPATIENT)
Dept: FAMILY MEDICINE CLINIC | Facility: CLINIC | Age: 45
End: 2025-03-07
Payer: COMMERCIAL

## 2025-03-07 DIAGNOSIS — R53.82 CHRONIC FATIGUE: ICD-10-CM

## 2025-03-07 DIAGNOSIS — Z00.00 ANNUAL PHYSICAL EXAM: ICD-10-CM

## 2025-03-07 LAB
ALBUMIN SERPL-MCNC: 4.3 G/DL (ref 3.5–5.2)
ALBUMIN/GLOB SERPL: 1.5 G/DL
ALP SERPL-CCNC: 101 U/L (ref 39–117)
ALT SERPL W P-5'-P-CCNC: 35 U/L (ref 1–41)
ANION GAP SERPL CALCULATED.3IONS-SCNC: 8.1 MMOL/L (ref 5–15)
AST SERPL-CCNC: 22 U/L (ref 1–40)
BASOPHILS # BLD AUTO: 0.07 10*3/MM3 (ref 0–0.2)
BASOPHILS NFR BLD AUTO: 0.7 % (ref 0–1.5)
BILIRUB SERPL-MCNC: 0.4 MG/DL (ref 0–1.2)
BUN SERPL-MCNC: 9 MG/DL (ref 6–20)
BUN/CREAT SERPL: 9.8 (ref 7–25)
CALCIUM SPEC-SCNC: 9.4 MG/DL (ref 8.6–10.5)
CHLORIDE SERPL-SCNC: 102 MMOL/L (ref 98–107)
CHOLEST SERPL-MCNC: 256 MG/DL (ref 0–200)
CO2 SERPL-SCNC: 25.9 MMOL/L (ref 22–29)
CREAT SERPL-MCNC: 0.92 MG/DL (ref 0.76–1.27)
DEPRECATED RDW RBC AUTO: 41.1 FL (ref 37–54)
EGFRCR SERPLBLD CKD-EPI 2021: 105.8 ML/MIN/1.73
EOSINOPHIL # BLD AUTO: 0.21 10*3/MM3 (ref 0–0.4)
EOSINOPHIL NFR BLD AUTO: 2.1 % (ref 0.3–6.2)
ERYTHROCYTE [DISTWIDTH] IN BLOOD BY AUTOMATED COUNT: 13.2 % (ref 12.3–15.4)
FOLATE SERPL-MCNC: 12.5 NG/ML (ref 4.78–24.2)
GLOBULIN UR ELPH-MCNC: 2.9 GM/DL
GLUCOSE SERPL-MCNC: 102 MG/DL (ref 65–99)
HCT VFR BLD AUTO: 50.6 % (ref 37.5–51)
HDLC SERPL-MCNC: 25 MG/DL (ref 40–60)
HGB BLD-MCNC: 17.6 G/DL (ref 13–17.7)
IMM GRANULOCYTES # BLD AUTO: 0.02 10*3/MM3 (ref 0–0.05)
IMM GRANULOCYTES NFR BLD AUTO: 0.2 % (ref 0–0.5)
LDLC SERPL CALC-MCNC: 195 MG/DL (ref 0–100)
LDLC/HDLC SERPL: 7.74 {RATIO}
LYMPHOCYTES # BLD AUTO: 2.6 10*3/MM3 (ref 0.7–3.1)
LYMPHOCYTES NFR BLD AUTO: 26.3 % (ref 19.6–45.3)
MCH RBC QN AUTO: 29.9 PG (ref 26.6–33)
MCHC RBC AUTO-ENTMCNC: 34.8 G/DL (ref 31.5–35.7)
MCV RBC AUTO: 86.1 FL (ref 79–97)
MONOCYTES # BLD AUTO: 0.67 10*3/MM3 (ref 0.1–0.9)
MONOCYTES NFR BLD AUTO: 6.8 % (ref 5–12)
NEUTROPHILS NFR BLD AUTO: 6.33 10*3/MM3 (ref 1.7–7)
NEUTROPHILS NFR BLD AUTO: 63.9 % (ref 42.7–76)
NRBC BLD AUTO-RTO: 0 /100 WBC (ref 0–0.2)
PLATELET # BLD AUTO: 366 10*3/MM3 (ref 140–450)
PMV BLD AUTO: 10.3 FL (ref 6–12)
POTASSIUM SERPL-SCNC: 4.2 MMOL/L (ref 3.5–5.2)
PROT SERPL-MCNC: 7.2 G/DL (ref 6–8.5)
RBC # BLD AUTO: 5.88 10*6/MM3 (ref 4.14–5.8)
SODIUM SERPL-SCNC: 136 MMOL/L (ref 136–145)
TESTOST SERPL-MCNC: 608 NG/DL (ref 249–836)
TRIGL SERPL-MCNC: 188 MG/DL (ref 0–150)
TSH SERPL DL<=0.05 MIU/L-ACNC: 0.98 UIU/ML (ref 0.27–4.2)
VIT B12 BLD-MCNC: 521 PG/ML (ref 211–946)
VLDLC SERPL-MCNC: 36 MG/DL (ref 5–40)
WBC NRBC COR # BLD AUTO: 9.9 10*3/MM3 (ref 3.4–10.8)

## 2025-03-07 PROCEDURE — 84403 ASSAY OF TOTAL TESTOSTERONE: CPT | Performed by: PHYSICIAN ASSISTANT

## 2025-03-07 PROCEDURE — 36415 COLL VENOUS BLD VENIPUNCTURE: CPT

## 2025-03-07 PROCEDURE — 82746 ASSAY OF FOLIC ACID SERUM: CPT | Performed by: PHYSICIAN ASSISTANT

## 2025-03-07 PROCEDURE — 82607 VITAMIN B-12: CPT | Performed by: PHYSICIAN ASSISTANT

## 2025-03-07 PROCEDURE — 80050 GENERAL HEALTH PANEL: CPT | Performed by: PHYSICIAN ASSISTANT

## 2025-03-07 PROCEDURE — 80061 LIPID PANEL: CPT | Performed by: PHYSICIAN ASSISTANT

## 2025-03-10 ENCOUNTER — RESULTS FOLLOW-UP (OUTPATIENT)
Dept: FAMILY MEDICINE CLINIC | Facility: CLINIC | Age: 45
End: 2025-03-10
Payer: COMMERCIAL

## 2025-03-10 DIAGNOSIS — E78.2 MIXED HYPERLIPIDEMIA: Primary | ICD-10-CM

## 2025-03-17 ENCOUNTER — HOSPITAL ENCOUNTER (OUTPATIENT)
Dept: CT IMAGING | Facility: HOSPITAL | Age: 45
Discharge: HOME | End: 2025-03-17
Admitting: PHYSICIAN ASSISTANT

## 2025-03-17 DIAGNOSIS — E78.2 MIXED HYPERLIPIDEMIA: ICD-10-CM

## 2025-03-17 PROCEDURE — 75571 CT HRT W/O DYE W/CA TEST: CPT

## 2025-03-24 RX ORDER — DICLOFENAC SODIUM 75 MG/1
75 TABLET, DELAYED RELEASE ORAL 2 TIMES DAILY
Qty: 30 TABLET | Refills: 1 | Status: SHIPPED | OUTPATIENT
Start: 2025-03-24

## 2025-04-03 NOTE — H&P
GI Pre-operative History and Physical:    Referring Provider:    Eamon German PA-C Matthew David McColloug*    Chief complaint: Orozco's esophagus without dysplasia    History of present illness:      John Camarillo III is a 44 y.o. male who presents today for Procedure(s):  ESOPHAGOGASTRODUODENOSCOPY AND BARRX PROCEDURE for the indications listed below.     The updated Patient Profile was reviewed prior to the procedure, in conjunction with the Physical Exam, including medical conditions, surgical procedures, medications, allergies, family history and social history.     Pre-operatively, I reviewed the indication(s) for the procedure, the risks of the procedure [including but not limited to: unexpected bleeding possibly requiring hospitalization and/or unplanned repeat procedures, perforation possibly requiring surgical treatment, missed lesions and complications of sedation/MAC (also explained by anesthesia staff)].     I have evaluated the patient for risks associated with the planned anesthesia and the procedure to be performed and find the patient an acceptable candidate for IV sedation.    Multiple opportunities were provided for any questions or concerns, and all questions were answered satisfactorily before any anesthesia was administered. We will proceed with the planned procedure.    Past Medical History:  Past Medical History:   Diagnosis Date    Allergic     Anxiety     Orozco esophagus     GERD (gastroesophageal reflux disease)     Hiatal hernia     Irritable bowel syndrome     Peptic ulceration     S/P surgery for recurrent dislocation of shoulder 1998    Smoker 1998       Past Surgical History:  Past Surgical History:   Procedure Laterality Date    CHOLECYSTECTOMY      COLONOSCOPY      ENDOSCOPY N/A 03/08/2024    Procedure: ESOPHAGOGASTRODUODENOSCOPY AND RADIOFREQUENCY ABLATION X2;  Surgeon: Mike Forrest MD;  Location: [facility] ENDOSCOPY;  Service: Gastroenterology;  Laterality:  N/A;  esophageal stricture, curtis's esophagus    ENDOSCOPY N/A 09/18/2024    Procedure: ESOPHAGOGASTRODUODENOSCOPY AND BARRX PROCEDURE;  Surgeon: Mike Forrest MD;  Location: [facility] ENDOSCOPY;  Service: Gastroenterology;  Laterality: N/A;    ENDOSCOPY N/A 12/26/2024    Procedure: ESOPHAGOGASTRODUODENOSCOPY with radiofrequency ablation (20 ablations);  Surgeon: Mike Forrest MD;  Location: [facility] ENDOSCOPY;  Service: Gastroenterology;  Laterality: N/A;  postop: bartolo's esophagus, hiatal hernia    EYE SURGERY      KNEE ACL RECONSTRUCTION Right 2015    SHOULDER SURGERY Left 1999       Social History:  Social History     Tobacco Use    Smoking status: Every Day     Current packs/day: 1.00     Average packs/day: 0.5 packs/day for 27.3 years (14.3 ttl pk-yrs)     Types: Cigarettes     Start date: 1998     Passive exposure: Current    Smokeless tobacco: Never   Vaping Use    Vaping status: Never Used    Passive vaping exposure: Yes   Substance Use Topics    Alcohol use: Yes     Alcohol/week: 5.0 standard drinks of alcohol     Types: 5 Cans of beer per week     Comment: rarely    Drug use: Never       Family History:  Family History   Problem Relation Age of Onset    Hypertension Mother     Hypertension Father     Stomach cancer Maternal Grandmother     Lung cancer Maternal Grandmother     Lung cancer Maternal Grandfather        Medications:  No medications prior to admission.       Scheduled Meds:  Continuous Infusions:No current facility-administered medications for this encounter.    PRN Meds:.    ALLERGIES:  Patient has no known allergies.    ROS:  The following systems were reviewed and negative;   Constitution:  No fevers, chills, no unintentional weight loss  Skin: no rash, no jaundice  Eyes:  No blurry vision, no eye pain  HENT:  No change in hearing or smell  Resp:  No dyspnea or cough  CV:  No chest pain or palpitations  :  No dysuria, hematuria  Musculoskeletal:  No leg cramps or  "arthralgias  Neuro:  No tremor, no numbness  Psych:  No depression or confusion    Objective     Vital Signs:   Vitals:    03/31/25 0941   Weight: 86.2 kg (190 lb)   Height: 175.3 cm (69\")       Physical Exam:       General Appearance:    Awake and alert, in no acute distress   Head:    Normocephalic, without obvious abnormality, atraumatic   Throat:   No oral lesions, no thrush, oral mucosa moist   Lungs:     respirations regular, even and unlabored   Skin:   No rash, no jaundice       Results Review:  Lab Results (last 24 hours)       ** No results found for the last 24 hours. **            Imaging Results (Last 24 Hours)       ** No results found for the last 24 hours. **             I reviewed the patient's labs and imaging.    ASSESSMENT AND PLAN:      Principal Problem:    Orozco's esophagus without dysplasia       Procedure(s):  ESOPHAGOGASTRODUODENOSCOPY AND BARRX PROCEDURE      I discussed the patients findings and my recommendations with the patient.    Electronically signed by Mike Forrest MD, 04/03/25, 5:31 PM EDT.              "

## 2025-04-04 ENCOUNTER — ANESTHESIA EVENT (OUTPATIENT)
Dept: GASTROENTEROLOGY | Facility: HOSPITAL | Age: 45
End: 2025-04-04
Payer: COMMERCIAL

## 2025-04-04 ENCOUNTER — HOSPITAL ENCOUNTER (OUTPATIENT)
Facility: HOSPITAL | Age: 45
Setting detail: HOSPITAL OUTPATIENT SURGERY
Discharge: HOME | End: 2025-04-04
Attending: INTERNAL MEDICINE | Admitting: INTERNAL MEDICINE
Payer: COMMERCIAL

## 2025-04-04 ENCOUNTER — ANESTHESIA (OUTPATIENT)
Dept: GASTROENTEROLOGY | Facility: HOSPITAL | Age: 45
End: 2025-04-04
Payer: COMMERCIAL

## 2025-04-04 ENCOUNTER — ON CAMPUS - OUTPATIENT (AMBULATORY)
Dept: URBAN - METROPOLITAN AREA HOSPITAL 85 | Facility: HOSPITAL | Age: 45
End: 2025-04-04
Payer: COMMERCIAL

## 2025-04-04 VITALS
WEIGHT: 195.2 LBS | OXYGEN SATURATION: 96 % | RESPIRATION RATE: 16 BRPM | HEIGHT: 69 IN | TEMPERATURE: 98.6 F | DIASTOLIC BLOOD PRESSURE: 87 MMHG | BODY MASS INDEX: 28.91 KG/M2 | SYSTOLIC BLOOD PRESSURE: 119 MMHG | HEART RATE: 81 BPM

## 2025-04-04 DIAGNOSIS — K20.0 EOSINOPHILIC ESOPHAGITIS: ICD-10-CM

## 2025-04-04 DIAGNOSIS — K22.70 BARRETT'S ESOPHAGUS WITHOUT DYSPLASIA: ICD-10-CM

## 2025-04-04 DIAGNOSIS — K44.9 DIAPHRAGMATIC HERNIA WITHOUT OBSTRUCTION OR GANGRENE: ICD-10-CM

## 2025-04-04 DIAGNOSIS — K22.70 BARRETT'S ESOPHAGUS: ICD-10-CM

## 2025-04-04 PROCEDURE — 25010000002 LIDOCAINE 2% SOLUTION

## 2025-04-04 PROCEDURE — 25810000003 SODIUM CHLORIDE 0.9 % SOLUTION

## 2025-04-04 PROCEDURE — 88305 TISSUE EXAM BY PATHOLOGIST: CPT | Performed by: INTERNAL MEDICINE

## 2025-04-04 PROCEDURE — 43239 EGD BIOPSY SINGLE/MULTIPLE: CPT | Performed by: INTERNAL MEDICINE

## 2025-04-04 PROCEDURE — 25810000003 SODIUM CHLORIDE 0.9 % SOLUTION: Performed by: INTERNAL MEDICINE

## 2025-04-04 PROCEDURE — 25010000002 PROPOFOL 10 MG/ML EMULSION

## 2025-04-04 PROCEDURE — 25010000002 ACETYLCYSTEINE PER 100 MG: Performed by: INTERNAL MEDICINE

## 2025-04-04 RX ORDER — ACETYLCYSTEINE 200 MG/ML
SOLUTION ORAL; RESPIRATORY (INHALATION)
Status: DISCONTINUED
Start: 2025-04-04 | End: 2025-04-04 | Stop reason: WASHOUT

## 2025-04-04 RX ORDER — ONDANSETRON 2 MG/ML
4 INJECTION INTRAMUSCULAR; INTRAVENOUS ONCE AS NEEDED
Status: DISCONTINUED | OUTPATIENT
Start: 2025-04-04 | End: 2025-04-04 | Stop reason: HOSPADM

## 2025-04-04 RX ORDER — SODIUM CHLORIDE 9 MG/ML
INJECTION, SOLUTION INTRAVENOUS CONTINUOUS PRN
Status: DISCONTINUED | OUTPATIENT
Start: 2025-04-04 | End: 2025-04-04 | Stop reason: SURG

## 2025-04-04 RX ORDER — LIDOCAINE HYDROCHLORIDE 20 MG/ML
INJECTION, SOLUTION INFILTRATION; PERINEURAL AS NEEDED
Status: DISCONTINUED | OUTPATIENT
Start: 2025-04-04 | End: 2025-04-04 | Stop reason: SURG

## 2025-04-04 RX ORDER — PROPOFOL 10 MG/ML
VIAL (ML) INTRAVENOUS AS NEEDED
Status: DISCONTINUED | OUTPATIENT
Start: 2025-04-04 | End: 2025-04-04 | Stop reason: SURG

## 2025-04-04 RX ORDER — SODIUM CHLORIDE 9 MG/ML
50 INJECTION, SOLUTION INTRAVENOUS CONTINUOUS
Status: DISCONTINUED | OUTPATIENT
Start: 2025-04-04 | End: 2025-04-04 | Stop reason: HOSPADM

## 2025-04-04 RX ADMIN — LIDOCAINE HYDROCHLORIDE 100 MG: 20 INJECTION, SOLUTION INFILTRATION; PERINEURAL at 09:02

## 2025-04-04 RX ADMIN — PROPOFOL 50 MG: 10 INJECTION, EMULSION INTRAVENOUS at 09:07

## 2025-04-04 RX ADMIN — PROPOFOL 50 MG: 10 INJECTION, EMULSION INTRAVENOUS at 09:03

## 2025-04-04 RX ADMIN — PROPOFOL 50 MG: 10 INJECTION, EMULSION INTRAVENOUS at 09:06

## 2025-04-04 RX ADMIN — PROPOFOL 50 MG: 10 INJECTION, EMULSION INTRAVENOUS at 09:09

## 2025-04-04 RX ADMIN — PROPOFOL 50 MG: 10 INJECTION, EMULSION INTRAVENOUS at 09:04

## 2025-04-04 RX ADMIN — SODIUM CHLORIDE 50 ML/HR: 9 INJECTION, SOLUTION INTRAVENOUS at 08:31

## 2025-04-04 RX ADMIN — SODIUM CHLORIDE: 9 INJECTION, SOLUTION INTRAVENOUS at 08:56

## 2025-04-04 RX ADMIN — PROPOFOL 100 MG: 10 INJECTION, EMULSION INTRAVENOUS at 09:02

## 2025-04-04 NOTE — ANESTHESIA POSTPROCEDURE EVALUATION
Patient: John Andradeehn III    Procedure Summary       Date: 04/04/25 Room / Location: [facility] ENDOSCOPY 1 / [facility] ENDOSCOPY    Anesthesia Start: 0856 Anesthesia Stop: 0915    Procedure: ESOPHAGOGASTRODUODENOSCOPY with biopsy x 3 areas Diagnosis:       Orozco's esophagus      (Orozco's esophagus [K22.70])    Surgeons: Mike Forrest MD Provider: Joce Barron MD    Anesthesia Type: general ASA Status: 3            Anesthesia Type: general    Vitals  Vitals Value Taken Time   /87 04/04/25 09:41   Temp     Pulse 81 04/04/25 09:45   Resp     SpO2 98 % 04/04/25 09:45   Vitals shown include unfiled device data.        Post Anesthesia Care and Evaluation    Patient location during evaluation: PACU  Patient participation: complete - patient participated  Level of consciousness: awake  Pain scale: See nurse's notes for pain score.  Pain management: adequate    Airway patency: patent  Anesthetic complications: No anesthetic complications  PONV Status: none  Cardiovascular status: acceptable  Respiratory status: acceptable and spontaneous ventilation  Hydration status: acceptable    Comments: Patient seen and examined postoperatively; vital signs stable; SpO2 greater than or equal to 90%; cardiopulmonary status stable; nausea/vomiting adequately controlled; pain adequately controlled; no apparent anesthesia complications; patient discharged from anesthesia care when discharge criteria were met

## 2025-04-04 NOTE — DISCHARGE INSTRUCTIONS
Dr. Forrest office [phone]      A responsible adult should stay with you and you should rest quietly for the rest of the day.    Do not drink alcohol, drive, operate any heavy machinery or power tools or make any legal/important decisions for the next 24 hours.     Progress your diet as tolerated.  If you begin to experience severe pain, increased shortness of breath, racing heartbeat or a fever above 101 F, seek immediate medical attention.     Follow up with MD as instructed. Call office for results in 3 to 5 business days if needed.      Impression:  EGD shows complete resolution of Orozco's esophagus after radiofrequency ablation x 3.  Findings suggest eosinophilic esophagitis, grade C erosive esophagitis, and a 4 cm hiatal hernia.     Recommendations:  Follow-up on pathology  Continue omeprazole 40 mg p.o. twice daily  Refer for hiatal hernia repair  Patient is high risk for recurrence of Orozco's given ongoing acid reflux

## 2025-04-04 NOTE — OP NOTE
ESOPHAGOGASTRODUODENOSCOPY AND BARRX PROCEDURE Procedure Report    Patient Name:  John Andradeehn III  YOB: 1980    Date of Surgery:  4/4/2025     Preop diagnosis:  Orozco's esophagus [K22.70]    Post-Op Diagnosis Codes:     * Orozco's esophagus [K22.70]  4 cm hiatal hernia  Grade C erosive esophagitis  Ringed esophagus    CT ESOPHAGOSCOPY FLEX TRANSORAL LESION ABLATION [40601]    Procedure(s):  ESOPHAGOGASTRODUODENOSCOPY with biopsy x 3 areas    Staff:  Surgeon(s):  Mike Forrest MD      Anesthesia: Monitored Anesthesia Care    DNR status: Patient wishes full code during the procedure    Implants:    Nothing was implanted during the procedure    Specimen:        See Below    Estimated blood loss:   None    Complications:  None    Description of Procedure:  Informed consent was obtained for the procedure, including sedation.  Risks of perforation, hemorrhage, adverse drug reaction and aspiration were discussed.  The patient was brought into the endoscopy suite. Continuous cardiopulmonary monitoring was performed. The patient was placed in the left lateral decubitus position.  The bite block was inserted into the patient's mouth. After adequate sedation was attained, the Olympus gastroscope was inserted into the patient's mouth and advanced to the second portion of the duodenum without difficulty.  Circumferential examination was performed. A retroflex exam was performed in the patient's stomach.  On completion of the exam, the bowel was decompressed, the scope was removed from the patient, the patient tolerated the procedure well, there were no immediate post-operative complications.     Examination of the esophagus: Concentric rings were noted throughout the esophagus without obstruction suggesting eosinophilic esophagitis.  Cold forceps biopsies were taken of the mid and upper esophagus to evaluate for eosinophils.  Grade C erosive esophagitis was noted at the GE junction with no  evidence of Orozco's esophagus.  Orozco's tissue appears to have been replaced with normal squamous tissue after radiofrequency ablation.  Original [place] classification C3M4.  Cold forceps biopsies were taken of the distal esophagus to evaluate for varied glands.  A 4 cm hiatal hernia was noted from 36 to 40 cm  Examination of the stomach: Normal  Examination of the duodenum: Normal    Impression:  EGD shows complete resolution of Orozco's esophagus after radiofrequency ablation x 3.  Findings suggest eosinophilic esophagitis, grade C erosive esophagitis, and a 4 cm hiatal hernia.    Recommendations:  Follow-up on pathology  Continue omeprazole 40 mg p.o. twice daily  Refer for hiatal hernia repair  Patient is high risk for recurrence of Orozco's given ongoing acid reflux    We appreciate the referral    Electronically signed by Mike Forrest MD, 04/04/25, 9:15 AM EDT.

## 2025-04-04 NOTE — ANESTHESIA PREPROCEDURE EVALUATION
Anesthesia Evaluation     Patient summary reviewed and Nursing notes reviewed   no history of anesthetic complications:   NPO Solid Status: > 8 hours  NPO Liquid Status: > 2 hours           Airway   Mallampati: II  TM distance: >3 FB  Neck ROM: full  No difficulty expected  Dental - normal exam     Pulmonary - normal exam   (+) a smoker Current,  Cardiovascular - normal exam    ECG reviewed    (+) BRANDT      Neuro/Psych  (+) dizziness/light headedness, psychiatric history Anxiety  GI/Hepatic/Renal/Endo    (+) hiatal hernia, GERD, PUD    Musculoskeletal     Abdominal  - normal exam   Substance History   (+) alcohol use     OB/GYN          Other   arthritis,     ROS/Med Hx Other: Additional History:  Allergies, vertigo, abd pain, IBS, Orozco's esophagus    Echo:  Echocardiogram Findings    Left Ventricle Left ventricular systolic function is normal. Left ventricular ejection fraction appears to be 56 - 60%.   Global longitudinal LV strain (GLS) = -15.9%. Left ventricle strain data was reviewed by the physician and found to be accurate. Normal left ventricular cavity size and wall thickness noted. All left ventricular wall segments contract normally. Left ventricular diastolic function was normal. Normal left atrial pressure. No evidence of left ventricular thrombus or mass present.  Right Ventricle Normal right ventricular cavity size and systolic function noted.  Left Atrium Normal left atrial cavity size noted.  Right Atrium Normal right atrial cavity size noted.  Aortic Valve The aortic valve is structurally normal. No significant aortic valve regurgitation is present. No hemodynamically significant aortic valve stenosis is present.  Mitral Valve The mitral valve is structurally normal with no significant stenosis present. No significant mitral valve regurgitation is present.  Tricuspid Valve The tricuspid valve is structurally normal with no significant regurgitation or significant stenosis present.  Pulmonic  Valve The pulmonic valve is not well visualized.  Greater Vessels No dilation of the aortic root is present.  Pericardium There is no evidence of pericardial effusion. .        Stress Test:  ·  No ECG evidence of myocardial ischemia.  ·  Negative clinical evidence of myocardial ischemia.  ·  Findings consistent with a normal ECG stress test.  ·  Patient walked for total of 9: 33 minutes and achieved workload of 10.1 METS        PSH:  COLONOSCOPY EYE SURGERY  CHOLECYSTECTOMY KNEE ACL RECONSTRUCTION  ENDOSCOPY ENDOSCOPY                    Anesthesia Plan    ASA 3     general   total IV anesthesia  (Patient identified; pre-operative vital signs, all relevant labs/studies, complete medical/surgical/anesthetic history, full medication list, full allergy list, and NPO status obtained/reviewed; physical assessment performed; anesthetic options, side effects, potential complications, risks, and benefits discussed; questions answered; written anesthesia consent obtained; patient cleared for procedure; anesthesia machine and equipment checked and functioning)  intravenous induction     Anesthetic plan, risks, benefits, and alternatives have been provided, discussed and informed consent has been obtained with: patient.    Plan discussed with CRNA.      CODE STATUS:

## 2025-04-04 NOTE — LETTER
April 4, 2025     Patient: John Camarillo III   YOB: 1980   Date of Visit: 4/4/2025       To Whom It May Concern:    John Camarillo came in for a procedure on 4/4/25 with Dr. Forrest.    Sincerely, Chacorta SHARMA

## 2025-04-04 NOTE — LETTER
April 4, 2025     Patient: John Camarillo III   YOB: 1980   Date of Visit: 4/4/2024       To Whom It May Concern:    John Camarillo was a patient at [facility] today 4/4/2024.           Sincerely,            Any questions you may call Dr. Lau office [phone]

## 2025-04-07 LAB
LAB AP CASE REPORT: NORMAL
LAB AP DIAGNOSIS COMMENT: NORMAL
PATH REPORT.FINAL DX SPEC: NORMAL
PATH REPORT.GROSS SPEC: NORMAL

## 2025-04-17 NOTE — PROGRESS NOTES
General Surgery Clinic Note  Subjective:  John Camarillo III is a 44 y.o. male who presents today for evaluation of hiatal hernia. Referred by Mike Forrest and was last evaluated for this issue on 4/4/25.     Patient has a known history of GERD, which has progressed to Orozco's esophagus.  Patient has undergone a prior RFA for this issue.  He underwent repeat EGD on 4/4/2025, which was remarkable for concentric esophageal rings concerning for eosinophilic esophagitis and a 4 cm hiatal hernia.  Biopsies of the prior RFA site were again noted to be positive for Orozco's esophagus without dysplasia.  Further esophageal biopsies were consistent with eosinophilic esophagitis.  Patient was referred to my practice for hiatal hernia repair given these findings.    Today, patient reports longstanding GERD since he was 18 years old.  He reports he used to be on Prilosec 40 twice daily, but this was able to be weaned down to 40 mg every day following his cholecystectomy.  He reports this was about 15 to 20 years ago.  Given his worsening symptoms, patient was advised to increase his Prilosec to twice daily, but this led to constipation.    In terms of his GERD related symptoms, patient reports heartburn, lower chest versus epigastric abdominal pain described as muscle spasms, bloating, belching, and regurgitation.  Denies waterbrash, early satiety, nausea, vomiting, or dysphagia.  Patient reports he has not received any treatment for eosinophilic esophagitis.    PMH: GERD/BE, PUD, IBS  PSH: lap cullen (15-20 years ago)  current smoker - 1 pk/day x20 years  no anticoagulants/antiplatelet agents  RCRI 0, METS > 4    Previous work-up:  EGD   3/8/2024:  4 cm segment of salmon-colored mucosa in the distal esophagus extending from the GE junction; RFA #1 performed  Benign-appearing stricture seen just proximal to the Orozco's tissue  Concentric rings were noted diffusely in the midesophagus suggesting eosinophilic  esophagitis  4 cm hiatal hernia  9/18/24:   residual islands of salmon-colored mucosa were noted in the distal esophagus, proximal two thirds of the original Orozco's open have been replaced normal squamous tissue; RFA #2 performed  Concentric rings were noted diffuse throughout the esophagus consistent with eosinophilic esophagitis  12/28/2024:  Islands of salmon-colored mucosa were noted in the distal esophagus; RFA #3 performed  Concentric rings throughout the esophagus consistent with known as eosinophilic esophagitis  4/4/25:  concentric rings were noted throughout the esophagus without obstruction suggesting eosinophilic esophagitis <path: Eosinophilic esophagitis (focally up to 16 eosinophils per high-power field)>  LA grade C esophagitis, no evidence of Orozco's esophagus; Orozco's tissue.  The been replaced with normal squamous tissue after radiofrequency ablation <path: Squamoglandular mucosa with intestinal metaplasia consistent with Orozco's esophagus, Negative for dysplasia>  4 cm hiatal hernia    Past Medical History:   Diagnosis Date    Allergic     Anxiety     Orozco esophagus     GERD (gastroesophageal reflux disease)     Hiatal hernia     Irritable bowel syndrome     Peptic ulceration     S/P surgery for recurrent dislocation of shoulder 1998    Smoker 1998     Family History   Problem Relation Age of Onset    Hypertension Mother     Hypertension Father     Stomach cancer Maternal Grandmother     Lung cancer Maternal Grandmother     Lung cancer Maternal Grandfather      Social History     Socioeconomic History    Marital status: Single   Tobacco Use    Smoking status: Every Day     Current packs/day: 1.00     Average packs/day: 0.5 packs/day for 27.3 years (14.3 ttl pk-yrs)     Types: Cigarettes     Start date: 1998     Passive exposure: Current    Smokeless tobacco: Never   Vaping Use    Vaping status: Never Used    Passive vaping exposure: Yes   Substance and Sexual Activity    Alcohol use:  "Yes     Alcohol/week: 5.0 standard drinks of alcohol     Types: 5 Cans of beer per week     Comment: rarely    Drug use: Never    Sexual activity: Defer     Past Surgical History:   Procedure Laterality Date    CHOLECYSTECTOMY      COLONOSCOPY      ENDOSCOPY N/A 03/08/2024    Procedure: ESOPHAGOGASTRODUODENOSCOPY AND RADIOFREQUENCY ABLATION X2;  Surgeon: Mike Forrest MD;  Location: [facility] ENDOSCOPY;  Service: Gastroenterology;  Laterality: N/A;  esophageal stricture, curtis's esophagus    ENDOSCOPY N/A 09/18/2024    Procedure: ESOPHAGOGASTRODUODENOSCOPY AND BARRX PROCEDURE;  Surgeon: Mike Forrest MD;  Location: [facility] ENDOSCOPY;  Service: Gastroenterology;  Laterality: N/A;    ENDOSCOPY N/A 12/26/2024    Procedure: ESOPHAGOGASTRODUODENOSCOPY with radiofrequency ablation (20 ablations);  Surgeon: Mike Forrest MD;  Location: [facility] ENDOSCOPY;  Service: Gastroenterology;  Laterality: N/A;  postop: bartolo's esophagus, hiatal hernia    ENDOSCOPY N/A 4/4/2025    Procedure: ESOPHAGOGASTRODUODENOSCOPY with biopsy x 3 areas;  Surgeon: Mike Forrest MD;  Location: [facility] ENDOSCOPY;  Service: Gastroenterology;  Laterality: N/A;  erosive esophagitis , history of barretts esophagus post rfa    EYE SURGERY      KNEE ACL RECONSTRUCTION Right 2015    SHOULDER SURGERY Left 1999       Current Outpatient Medications:     amoxicillin (AMOXIL) 875 MG tablet, Take 1 tablet by mouth Every 12 (Twelve) Hours., Disp: , Rfl:     B-D TB SYRINGE 1CC/27GX1/2\" 27G X 1/2\" 1 ML misc, USE AS NEEDED FOR ALLERGY INJECTIONS, Disp: , Rfl:     cyclobenzaprine (FLEXERIL) 10 MG tablet, Take 1 tablet by mouth 3 (Three) Times a Day As Needed for Muscle Spasms., Disp: 30 tablet, Rfl: 1    EPINEPHrine (EPIPEN) 0.3 MG/0.3ML solution auto-injector injection, Inject 0.3 mL into the appropriate muscle as directed by prescriber 1 (One) Time., Disp: , Rfl:     fluticasone (FLONASE) 50 MCG/ACT nasal spray, SHAKE " LIQUID AND USE 2 SPRAYS IN EACH NOSTRIL DAILY, Disp: 48 g, Rfl: 1    loratadine-pseudoephedrine (Claritin-D 12 Hour) 5-120 MG per 12 hr tablet, Take 1 tablet by mouth 2 (Two) Times a Day., Disp: , Rfl:     omeprazole (priLOSEC) 40 MG capsule, TAKE 1 CAPSULE BY MOUTH TWICE DAILY 30 MINUTES BEFORE A MEAL, Disp: , Rfl:     VITAMIN D PO, Take  by mouth., Disp: , Rfl:     Wheat Dextrin (BENEFIBER PO), Take  by mouth., Disp: , Rfl:     Review of Systems:  All other systems reviewed and are negative.    Objective:     There were no vitals filed for this visit.   There is no height or weight on file to calculate BMI.   Physical Exam  Constitutional:       Appearance: Normal appearance. He is not ill-appearing.   Pulmonary:      Effort: Pulmonary effort is normal.   Abdominal:      General: Abdomen is flat. There is no distension.      Palpations: Abdomen is soft. There is no mass.      Tenderness: There is no abdominal tenderness. There is no guarding or rebound.      Hernia: No hernia is present.   Neurological:      Mental Status: He is alert and oriented to person, place, and time.   Psychiatric:         Mood and Affect: Mood normal.          Assessment and Plan:  Diagnoses and all orders for this visit:    1. Gastroesophageal reflux disease with esophagitis without hemorrhage (Primary)    2. Hiatal hernia    3. Orozco's esophagus without dysplasia    4. Eosinophilic esophagitis       44 y.o. male with LA grade C esophagitis associated with a hiatal hernia and known eosinophilic esophagitis. H/o SSBE, which has been successfully eradicated with RFA. Despite aggressive medical management, pt continues to have breakthrough symptoms attributed to GERD and would benefit from antireflux surgery.     I had a long conversation with the patient during which time I reviewed her symptoms, surgical history, and workup thus far. I drew diagrams of the relevant foregut anatomy and explained the necessary testing that would be  "required in order to have a better understanding of his current condition. I also explained the tenants of foregut surgery and described my reasoning for a tailored approach to antireflux.     We also discussed the relative contraindications of antireflux surgery including active nicotine use and eosinophilic esophagitis. We discussed that he must be nicotine-free for at least 30 days prior to consider of surgery. In the meantime, he would benefit from inhaled corticosteroids for his EoE. If this is successful, and he has stopped smoking, then he would be an ideal candidate for a robotic hiatal hernia repair with toupet fundoplication. Pt voiced understanding, but does not wish to stop smoking at this time. As such, I advised him that I would not be comfortable with performing foregut surgery on him given the increased risk of complications and likelihood of recurrent symptoms long-term. He was rather frustrated by this news, as he feels that his smoking has no bearing on his foregut health. In his own words, pt said \"I rather just get it fixed and deal with any issues afterwards\". As such, I did offer him a referral to another center for a second opinion, which he agreed to.    Refer to  for second opinion    Josse Watkins MD   General Surgery  04/17/25   13:12 EDT      Much of this encounter note is an electronic transcription/translation of spoken language to printed text.  The electronic translation of spoken language may permit erroneous, or at times, nonsensical words or phrases to be inadvertently transcribed.  Although I have reviewed the note for such errors, some may still exist.    "

## 2025-04-24 ENCOUNTER — OFFICE VISIT (OUTPATIENT)
Dept: SURGERY | Facility: CLINIC | Age: 45
End: 2025-04-24
Payer: COMMERCIAL

## 2025-04-24 VITALS
WEIGHT: 198 LBS | HEIGHT: 69 IN | SYSTOLIC BLOOD PRESSURE: 141 MMHG | TEMPERATURE: 97.8 F | DIASTOLIC BLOOD PRESSURE: 97 MMHG | HEART RATE: 93 BPM | BODY MASS INDEX: 29.33 KG/M2 | OXYGEN SATURATION: 98 %

## 2025-04-24 DIAGNOSIS — K20.0 EOSINOPHILIC ESOPHAGITIS: ICD-10-CM

## 2025-04-24 DIAGNOSIS — K44.9 HIATAL HERNIA: ICD-10-CM

## 2025-04-24 DIAGNOSIS — K22.70 BARRETT'S ESOPHAGUS WITHOUT DYSPLASIA: ICD-10-CM

## 2025-04-24 DIAGNOSIS — K21.00 GASTROESOPHAGEAL REFLUX DISEASE WITH ESOPHAGITIS WITHOUT HEMORRHAGE: Primary | ICD-10-CM

## 2025-04-24 RX ORDER — DICLOFENAC SODIUM 75 MG/1
TABLET, DELAYED RELEASE ORAL
COMMUNITY
Start: 2025-04-15

## 2025-05-20 ENCOUNTER — OFFICE VISIT (OUTPATIENT)
Dept: FAMILY MEDICINE CLINIC | Facility: CLINIC | Age: 45
End: 2025-05-20
Payer: COMMERCIAL

## 2025-05-20 VITALS
HEIGHT: 69 IN | WEIGHT: 190.6 LBS | SYSTOLIC BLOOD PRESSURE: 124 MMHG | HEART RATE: 98 BPM | OXYGEN SATURATION: 97 % | BODY MASS INDEX: 28.23 KG/M2 | DIASTOLIC BLOOD PRESSURE: 80 MMHG

## 2025-05-20 DIAGNOSIS — K59.00 CONSTIPATION, UNSPECIFIED CONSTIPATION TYPE: Primary | ICD-10-CM

## 2025-05-20 DIAGNOSIS — L55.9 SUNBURN: ICD-10-CM

## 2025-05-20 DIAGNOSIS — K20.0 EOSINOPHILIC ESOPHAGITIS: ICD-10-CM

## 2025-05-20 PROBLEM — Z00.00 ANNUAL PHYSICAL EXAM: Status: RESOLVED | Noted: 2022-10-06 | Resolved: 2025-05-20

## 2025-05-20 PROCEDURE — 99213 OFFICE O/P EST LOW 20 MIN: CPT | Performed by: STUDENT IN AN ORGANIZED HEALTH CARE EDUCATION/TRAINING PROGRAM

## 2025-05-20 NOTE — PROGRESS NOTES
"Chief Complaint  Chief Complaint   Patient presents with    Sunburn    Constipation       Subjective        John Camarillo III is a 45 y.o. male who presents to [facility].  History of Present Illness    John (\"Gregoria\") is a 45-year-old male here for multiple concerns.      Constipation  Patient reports constipation for the last few days.  Today he took MiraLAX, milk of magnesia, and a few over-the-counter \"tablets\" (unsure of name).  Since then it seems to have resolved his constipation.  Denies issues with chronic constipation.  States it happened similarly about a year ago.      Sunburn  States he was outside in the sun yesterday for several hours cutting wood and developed sunburn over his back  Has been applying calamine lotion      Eosinophilic esophagitis  Patient recently diagnosed with eosinophilic esophagitis after biopsies taken from EGD  He was seen by general surgeon afterwards (to discuss hernia repair) who recommended he start steroids for eosinophilic esophagitis      Objective   /80   Pulse 98   Ht 175.3 cm (69\")   Wt 86.5 kg (190 lb 9.6 oz)   SpO2 97%   BMI 28.15 kg/m²     Estimated body mass index is 28.15 kg/m² as calculated from the following:    Height as of this encounter: 175.3 cm (69\").    Weight as of this encounter: 86.5 kg (190 lb 9.6 oz).     Physical Exam   GEN: In no acute distress, non toxic appearing  HEENT: MMM. EOMI.   CV: No extremity edema.   RESP: No signs of respiratory distress.  SKIN: Diffuse blanching erythema present across the entire upper and lower back.  No blistering noted.  MSK: No deformity.   NEURO: Moves all extremities equally. Alert and appropriate         Result Review :              Assessment and Plan     Diagnoses and all orders for this visit:    1. Constipation  Acute constipation that has resolved with over-the-counter medications  Recommend pushing p.o. fluids on a regular basis to decrease chance of " constipation  If constipation persist and he needs something on a more regular basis would recommend MiraLAX 1-2 times daily    2. Sunburn  Continue topical aloe vera  Can take ibuprofen as needed for pain  Can also trial topical diclofenac gel for pain.  Counseled on the possibility of contact dermatitis with this medication and advised to try and a small spot first to see how he reacts.    3. Eosinophilic esophagitis  Recommend returning to his GI doctors office to discuss liquid steroid for EoE          Follow Up     No follow-ups on file.

## 2025-06-06 ENCOUNTER — OFFICE VISIT (OUTPATIENT)
Dept: CARDIOLOGY | Facility: CLINIC | Age: 45
End: 2025-06-06
Payer: COMMERCIAL

## 2025-06-06 VITALS
RESPIRATION RATE: 16 BRPM | HEART RATE: 92 BPM | HEIGHT: 69 IN | OXYGEN SATURATION: 94 % | DIASTOLIC BLOOD PRESSURE: 88 MMHG | BODY MASS INDEX: 28.88 KG/M2 | WEIGHT: 195 LBS | SYSTOLIC BLOOD PRESSURE: 130 MMHG

## 2025-06-06 DIAGNOSIS — E78.5 HYPERLIPIDEMIA LDL GOAL <100: Primary | ICD-10-CM

## 2025-06-06 NOTE — PROGRESS NOTES
"Cardiology Clinic Note  Mike Moran MD, PhD    Subjective:     Encounter Date:06/06/2025      Patient ID: John Camarillo III is a 45 y.o. male.    Chief Complaint:  Chief Complaint   Patient presents with    Follow-up     1 yr followup       HPI:    I the pleasure to see this 45-year-old today,prior cardiac workup 2024 with normal EF, normal diastolic function no significant valvular abnormality with normal atrial sizes, normal stress test walking only 10 minutes with no ECG changes consistent with any ischemia.  Had a coronary calcium score of 0 this year.  Denies any chest pain shortness of breath, does smoke and has Orozco's esophagus with chronic gastroesophageal reflux and hiatal hernia seeing GI regularly.  He has an LDL of over 190 we discussed statin therapy for which he is not willing to try at this point I will try dietary changes and activity along with smoking cessation that we discussed today.  EKG is normal    Review of systems otherwise negative x 14 point review of systems except as mentioned above    Historical data copied forward from previous encounters in EMR including the history, exam, and assessment/plan has been reviewed and is unchanged unless noted otherwise.    Cardiac medicines reviewed with risk, benefits, and necessity of each discussed.    Risk and benefit of cardiac testing reviewed including death heart attack stroke pain bleeding infection need for vascular /cardiovascular surgery were discussed and the patient     Objective:         /88 (BP Location: Right arm, Patient Position: Sitting)   Pulse 92 Comment: ekg  Resp 16   Ht 175.3 cm (69\")   Wt 88.5 kg (195 lb)   SpO2 94%   BMI 28.80 kg/m²     Physical Exam  Regular rate rhythm no rubs murmurs or gallops  No Elif lift  No clubbing cyanosis or edema  Normal CV exam for  Assessment:       Preventative health care  Normal echo  Normal stress test 2024  Elevated LDL and hyperlipidemia, recommend statin therapy for LDL " greater than 190, patient not willing to try at this point, extensive diet and exercise and lifestyle changes recommended and recheck in 3 to 6 months with primary care  Blood pressures goal 130 systolic  Diet and exercise per AHA guidelines  Normal EKG today    Can follow-up with cardiology as needed would recommend follow-up with primary care and advancing lipid-lowering therapy along with lifestyle changes    Mike Moran MD, PhD        The pleasure to be involved in this patient's cardiovascular care.  Please call with any questions or concerns  Mike Moran MD, PhD    Most recent EKG as reviewed and interpreted by me:    ECG 12 Lead    Date/Time: 6/6/2025 12:46 PM  Performed by: Mike Moran MD    Authorized by: Mike Moran MD  Comparison: not compared with previous ECG   Previous ECG: no previous ECG available  Rhythm: sinus rhythm  Rate: normal  Conduction: conduction normal  QRS axis: normal    Clinical impression: normal ECG           Most recent echo as reviewed and interpreted by me:  Results for orders placed during the hospital encounter of 04/18/24    Adult Transthoracic Echo Complete W/ Cont if Necessary Per Protocol    Interpretation Summary    Left ventricular systolic function is normal. Left ventricular ejection fraction appears to be 56 - 60%.    Left ventricular diastolic function was normal.    TAPSE is 19.9.  RV ejection fraction is 48.4%      Most recent stress test as reviewed and interpreted by me:  Results for orders placed during the hospital encounter of 04/18/24    Treadmill Stress Test    Interpretation Summary    No ECG evidence of myocardial ischemia.    Negative clinical evidence of myocardial ischemia.    Findings consistent with a normal ECG stress test.    Patient walked for total of 9: 33 minutes and achieved workload of 10.1 METS      Most recent cardiac catheterization as reviewed interpreted by me:  No results found for this or any previous  "visit.    The following portions of the patient's history were reviewed and updated as appropriate: allergies, current medications, past family history, past medical history, past social history, past surgical history, and problem list.      ROS:  14 point review of systems negative except as mentioned above    Current Outpatient Medications:     ALLERGY SERUM INJECTION, Inject  under the skin into the appropriate area as directed 1 (One) Time., Disp: , Rfl:     B-D TB SYRINGE 1CC/27GX1/2\" 27G X 1/2\" 1 ML misc, USE AS NEEDED FOR ALLERGY INJECTIONS, Disp: , Rfl:     cyclobenzaprine (FLEXERIL) 10 MG tablet, Take 1 tablet by mouth 3 (Three) Times a Day As Needed for Muscle Spasms., Disp: 30 tablet, Rfl: 1    diclofenac (VOLTAREN) 75 MG EC tablet, Take 1 tablet by mouth Daily., Disp: , Rfl:     EPINEPHrine (EPIPEN) 0.3 MG/0.3ML solution auto-injector injection, Inject 0.3 mL into the appropriate muscle as directed by prescriber 1 (One) Time., Disp: , Rfl:     fluticasone (FLONASE) 50 MCG/ACT nasal spray, SHAKE LIQUID AND USE 2 SPRAYS IN EACH NOSTRIL DAILY, Disp: 48 g, Rfl: 1    loratadine-pseudoephedrine (Claritin-D 12 Hour) 5-120 MG per 12 hr tablet, Take 1 tablet by mouth 2 (Two) Times a Day., Disp: , Rfl:     omeprazole (priLOSEC) 40 MG capsule, TAKE 1 CAPSULE BY MOUTH TWICE DAILY 30 MINUTES BEFORE A MEAL, Disp: , Rfl:     VITAMIN D PO, Take  by mouth., Disp: , Rfl:     Problem List:  Patient Active Problem List   Diagnosis    OLIVIER (generalized anxiety disorder)    Tobacco dependence due to cigarettes    Unintentional weight loss    Dyspnea on exertion    Chronic left ear pain    Dysfunction of left eustachian tube    Change in pigmented skin lesion of face    Intermittent left upper quadrant abdominal pain    Cholesteatoma of left ear    Disorder of penis    Vertigo    Strain of thoracic back region    Orozco's esophagus without dysplasia    Dyslipidemia    Non-sustained ventricular tachycardia    Chronic fatigue    " SI joint arthritis     Past Medical History:  Past Medical History:   Diagnosis Date    Allergic     Anxiety     Curtis esophagus     GERD (gastroesophageal reflux disease)     Hiatal hernia     Irritable bowel syndrome     Peptic ulceration     S/P surgery for recurrent dislocation of shoulder 1998    Smoker 1998     Past Surgical History:  Past Surgical History:   Procedure Laterality Date    CHOLECYSTECTOMY      COLONOSCOPY      ENDOSCOPY N/A 03/08/2024    Procedure: ESOPHAGOGASTRODUODENOSCOPY AND RADIOFREQUENCY ABLATION X2;  Surgeon: Mike Forrest MD;  Location: [facility] ENDOSCOPY;  Service: Gastroenterology;  Laterality: N/A;  esophageal stricture, curtis's esophagus    ENDOSCOPY N/A 09/18/2024    Procedure: ESOPHAGOGASTRODUODENOSCOPY AND BARRX PROCEDURE;  Surgeon: Mike Forrest MD;  Location: [facility] ENDOSCOPY;  Service: Gastroenterology;  Laterality: N/A;    ENDOSCOPY N/A 12/26/2024    Procedure: ESOPHAGOGASTRODUODENOSCOPY with radiofrequency ablation (20 ablations);  Surgeon: Mike Forrest MD;  Location: [facility] ENDOSCOPY;  Service: Gastroenterology;  Laterality: N/A;  postop: bartolo's esophagus, hiatal hernia    ENDOSCOPY N/A 4/4/2025    Procedure: ESOPHAGOGASTRODUODENOSCOPY with biopsy x 3 areas;  Surgeon: Mike Forrest MD;  Location: [facility] ENDOSCOPY;  Service: Gastroenterology;  Laterality: N/A;  erosive esophagitis , history of barretts esophagus post rfa    EYE SURGERY      KNEE ACL RECONSTRUCTION Right 2015    SHOULDER SURGERY Left 1999     Social History:  Social History     Socioeconomic History    Marital status: Single   Tobacco Use    Smoking status: Every Day     Current packs/day: 1.00     Average packs/day: 0.5 packs/day for 27.4 years (14.4 ttl pk-yrs)     Types: Cigarettes     Start date: 1998     Passive exposure: Current    Smokeless tobacco: Never   Vaping Use    Vaping status: Never Used    Passive vaping exposure: Yes   Substance and  Sexual Activity    Alcohol use: Yes     Alcohol/week: 5.0 standard drinks of alcohol     Types: 5 Cans of beer per week     Comment: rarely    Drug use: Never    Sexual activity: Defer     Allergies:  No Known Allergies  Immunizations:  Immunization History   Administered Date(s) Administered    Td, Unspecified 01/01/2015            In-Office Procedure(s):  No orders to display        ASCVD RIsk Score::  The 10-year ASCVD risk score (Ayse GAITAN, et al., 2019) is: 18.3%    Values used to calculate the score:      Age: 45 years      Sex: Male      Is Non- : No      Diabetic: No      Tobacco smoker: Yes      Systolic Blood Pressure: 130 mmHg      Is BP treated: No      HDL Cholesterol: 25 mg/dL      Total Cholesterol: 256 mg/dL    Imaging:         Results for orders placed during the hospital encounter of 03/17/25    CT Cardiac Calcium Score Without Dye    Narrative  CT CARDIAC CALCIUM SCORE WO DYE    Date of Exam: 3/17/2025 2:28 PM EDT    Indication: hld..    Comparison: CT chest 10/17/2022    Technique: Multiple thin section CT axial images were obtained with prospective ECG-gating without intravenous contrast.    Findings:  Agatston scores for individual coronary arteries are as follows:  Left main (LM): 0  Left anterior descending (LAD): 0  Left circumflex (CX): 0  Right coronary artery (RCA): 0  Total:  0    The visualized lungs are clear.    Impression  Impression:  Negative examination. No identifiable atherosclerotic plaque.    Calcium Score Interpretation  0 -- Negative examination, no identifiable atherosclerotic plaque.  Very low risk of cardiac events in the next 5 years.  1-10 -- Minimal plaque burden.  Low risk of cardiac events in the next 5 years.  11- 100 -- Mild Plaque burden.  Mild risk of cardiac events in the next 5 years.  101 - 400 -- Moderate plaque burden.  Moderate risk of cardiac events in the next 5 years.  Over 400 -- Extensive plaque burden.  High risk of cardiac  events in the next 5 years.      Electronically Signed: Sammie Jacobson MD  3/17/2025 2:58 PM EDT  Workstation ID: [id number]      Results for orders placed during the hospital encounter of 03/17/25    CT Cardiac Calcium Score Without Dye    Narrative  CT CARDIAC CALCIUM SCORE WO DYE    Date of Exam: 3/17/2025 2:28 PM EDT    Indication: hld..    Comparison: CT chest 10/17/2022    Technique: Multiple thin section CT axial images were obtained with prospective ECG-gating without intravenous contrast.    Findings:  Agatston scores for individual coronary arteries are as follows:  Left main (LM): 0  Left anterior descending (LAD): 0  Left circumflex (CX): 0  Right coronary artery (RCA): 0  Total:  0    The visualized lungs are clear.    Impression  Impression:  Negative examination. No identifiable atherosclerotic plaque.    Calcium Score Interpretation  0 -- Negative examination, no identifiable atherosclerotic plaque.  Very low risk of cardiac events in the next 5 years.  1-10 -- Minimal plaque burden.  Low risk of cardiac events in the next 5 years.  11- 100 -- Mild Plaque burden.  Mild risk of cardiac events in the next 5 years.  101 - 400 -- Moderate plaque burden.  Moderate risk of cardiac events in the next 5 years.  Over 400 -- Extensive plaque burden.  High risk of cardiac events in the next 5 years.      Electronically Signed: Sammie Jacobson MD  3/17/2025 2:58 PM EDT  Workstation ID: [id number]      Lab Review:   Admission on 04/04/2025, Discharged on 04/04/2025   Component Date Value    Case Report 04/04/2025                      Value:Surgical Pathology Report                         Case: [id number]                                  Authorizing Provider:  Mike Forrest, Collected:           04/04/2025 09:04 AM                                 MD                                                                           Ordering Location:     [facility]  Received:            04/04/2025 09:30 AM                                  SUITES                                                                       Pathologist:           Donny Suarez MD                                                             Specimens:   1) - Esophagus, 36 cm erosive esophagitis , history of barretts esophagus post rfa                  2) - Esophagus, 34 cm erosive esophagitis , history of barretts esophagus post rfa                  3) - Esophagus, Mid, r/o eoe                                                               Final Diagnosis 04/04/2025                      Value:Specimen #1 (Esophagus, 36 cm, biopsy):    Reactive squamous mucosa    No glandular mucosa present biopsy    Specimen #2 (Esophagus, 34 cm, biopsy):    Squamoglandular mucosa with intestinal metaplasia consistent with Orozco's esophagus    Negative for dysplasia    Specimen #3 (Esophagus, mid, biopsy):    Eosinophilic esophagitis (focally up to 16 eosinophils per high-power field)    No glandular mucosa present in biopsy    JPR        Comment 04/04/2025                      Value:Multiple deeper levels have been examined on specimens #1 and #2.      Gross Description 04/04/2025                      Value:1. Esophagus.  Received in formalin designated esophagus at 36 cm are a few fragments of whitish tissue measuring up to 0.2 cm in greatest dimension.  Submitted in 1 cassette.  RAFAL    2. Esophagus.  Received in formalin designated esophagus at 34 cm are several fragments of whitish tissue measuring up to 0.3 cm in greatest dimension.  Submitted in 1 cassette.  RAFAL    3. Esophagus, Mid.  Received in formalin designated midesophagus are a few fragments of whitish tissue measuring up to 0.3 cm in greatest dimension.  Submitted in 1 cassette.  RAFAL       Lab on 03/07/2025   Component Date Value    Testosterone, Total 03/07/2025 608.00     Glucose 03/07/2025 102 (H)     BUN 03/07/2025 9     Creatinine 03/07/2025 0.92     Sodium 03/07/2025 136     Potassium  03/07/2025 4.2     Chloride 03/07/2025 102     CO2 03/07/2025 25.9     Calcium 03/07/2025 9.4     Total Protein 03/07/2025 7.2     Albumin 03/07/2025 4.3     ALT (SGPT) 03/07/2025 35     AST (SGOT) 03/07/2025 22     Alkaline Phosphatase 03/07/2025 101     Total Bilirubin 03/07/2025 0.4     Globulin 03/07/2025 2.9     A/G Ratio 03/07/2025 1.5     BUN/Creatinine Ratio 03/07/2025 9.8     Anion Gap 03/07/2025 8.1     eGFR 03/07/2025 105.8     Total Cholesterol 03/07/2025 256 (H)     Triglycerides 03/07/2025 188 (H)     HDL Cholesterol 03/07/2025 25 (L)     LDL Cholesterol  03/07/2025 195 (H)     VLDL Cholesterol 03/07/2025 36     LDL/HDL Ratio 03/07/2025 7.74     TSH 03/07/2025 0.983     Vitamin B-12 03/07/2025 521     Folate 03/07/2025 12.50     WBC 03/07/2025 9.90     RBC 03/07/2025 5.88 (H)     Hemoglobin 03/07/2025 17.6     Hematocrit 03/07/2025 50.6     MCV 03/07/2025 86.1     MCH 03/07/2025 29.9     MCHC 03/07/2025 34.8     RDW 03/07/2025 13.2     RDW-SD 03/07/2025 41.1     MPV 03/07/2025 10.3     Platelets 03/07/2025 366     Neutrophil % 03/07/2025 63.9     Lymphocyte % 03/07/2025 26.3     Monocyte % 03/07/2025 6.8     Eosinophil % 03/07/2025 2.1     Basophil % 03/07/2025 0.7     Immature Grans % 03/07/2025 0.2     Neutrophils, Absolute 03/07/2025 6.33     Lymphocytes, Absolute 03/07/2025 2.60     Monocytes, Absolute 03/07/2025 0.67     Eosinophils, Absolute 03/07/2025 0.21     Basophils, Absolute 03/07/2025 0.07     Immature Grans, Absolute 03/07/2025 0.02     nRBC 03/07/2025 0.0      Recent labs reviewed and interpreted for clinical significance and application            Level of Care:           Mike Moran MD  06/06/25  .

## 2025-06-10 ENCOUNTER — TELEPHONE (OUTPATIENT)
Dept: CARDIOLOGY | Facility: CLINIC | Age: 45
End: 2025-06-10

## 2025-06-10 NOTE — TELEPHONE ENCOUNTER
"Caller: John Camarillo III \"Gregoria\"    Relationship to patient: Self    Best call back number: [phone]    Patient is needing: PT STATES THEY HAD DOCTOR'S NOTE FROM DR MCKEON, BUT ACCIDENTALLY GOT RAN THROUGH WASHING MACHINE AND NEEDS NEW ONE. WOULD LIKE E-MAILED TO THEM IF POSSIBLE AT [email or web address], PLEASE CALL PT TO ADVISE.  "

## 2025-06-25 ENCOUNTER — OFFICE VISIT (OUTPATIENT)
Dept: FAMILY MEDICINE CLINIC | Facility: CLINIC | Age: 45
End: 2025-06-25
Payer: COMMERCIAL

## 2025-06-25 VITALS
WEIGHT: 195 LBS | HEIGHT: 69 IN | BODY MASS INDEX: 28.88 KG/M2 | RESPIRATION RATE: 14 BRPM | HEART RATE: 102 BPM | SYSTOLIC BLOOD PRESSURE: 124 MMHG | OXYGEN SATURATION: 96 % | DIASTOLIC BLOOD PRESSURE: 80 MMHG

## 2025-06-25 DIAGNOSIS — M25.512 ACUTE PAIN OF LEFT SHOULDER: Primary | ICD-10-CM

## 2025-06-25 PROCEDURE — 99213 OFFICE O/P EST LOW 20 MIN: CPT | Performed by: NURSE PRACTITIONER

## 2025-06-25 NOTE — PROGRESS NOTES
"Chief Complaint  Shoulder Pain (L shoulder. X1 month. Denies injury )    Subjective          John Camarillo III presents to [facility]  History of Present Illness    Has been having left shoulder pain for about the past 1 month  Has pain in the arm pit, has ,uscle tension across the top of his shoulder, and also tension or tightness on the upper arm  Bothers him while he is driving, pickett can apply pressure and pull his arm down and he gets some relief    He does not recall any injury  He has full range of motion  He has been using an anti-inflammatory and flexeril, the flexeril helps him significantly    He has a remote h/o left shoulder surgery     Review of Systems   Constitutional: Negative.    Respiratory: Negative.     Cardiovascular: Negative.    Musculoskeletal:  Positive for arthralgias.     Objective   Vital Signs:  /80   Pulse 102   Resp 14   Ht 175.3 cm (69\")   Wt 88.5 kg (195 lb)   SpO2 96%   BMI 28.80 kg/m²     BP Readings from Last 3 Encounters:   06/25/25 124/80   06/06/25 130/88   05/20/25 124/80        Wt Readings from Last 3 Encounters:   06/25/25 88.5 kg (195 lb)   06/06/25 88.5 kg (195 lb)   05/20/25 86.5 kg (190 lb 9.6 oz)        BMI is >= 25 and <30. (Overweight) The following options were offered after discussion;: exercise counseling/recommendations and nutrition counseling/recommendations      Physical Exam  Vitals reviewed.   Constitutional:       Appearance: Normal appearance.   Cardiovascular:      Rate and Rhythm: Normal rate.   Pulmonary:      Effort: Pulmonary effort is normal.   Musculoskeletal:         General: Normal range of motion.      Left shoulder: Tenderness present. Normal range of motion.   Skin:     General: Skin is warm.   Neurological:      Mental Status: He is alert and oriented to person, place, and time.        Result Review :                 Assessment and Plan    Diagnoses and all orders for this visit:    1. Acute pain of " left shoulder (Primary)  -     Ambulatory Referral to Orthopedic Surgery           Follow Up   Return if symptoms worsen or fail to improve.  Patient was given instructions and counseling regarding his condition or for health maintenance advice. Please see specific information pulled into the AVS if appropriate.

## 2025-07-25 ENCOUNTER — OFFICE VISIT (OUTPATIENT)
Dept: FAMILY MEDICINE CLINIC | Facility: CLINIC | Age: 45
End: 2025-07-25
Payer: COMMERCIAL

## 2025-07-25 ENCOUNTER — TELEPHONE (OUTPATIENT)
Dept: FAMILY MEDICINE CLINIC | Facility: CLINIC | Age: 45
End: 2025-07-25

## 2025-07-25 VITALS
OXYGEN SATURATION: 97 % | WEIGHT: 193 LBS | HEIGHT: 69 IN | HEART RATE: 123 BPM | RESPIRATION RATE: 16 BRPM | BODY MASS INDEX: 28.58 KG/M2 | DIASTOLIC BLOOD PRESSURE: 92 MMHG | SYSTOLIC BLOOD PRESSURE: 146 MMHG

## 2025-07-25 DIAGNOSIS — R30.0 DYSURIA: Primary | ICD-10-CM

## 2025-07-25 DIAGNOSIS — R36.9 PENILE DISCHARGE: ICD-10-CM

## 2025-07-25 PROCEDURE — 99214 OFFICE O/P EST MOD 30 MIN: CPT | Performed by: PHYSICIAN ASSISTANT

## 2025-07-25 RX ORDER — DOXYCYCLINE 100 MG/1
100 CAPSULE ORAL 2 TIMES DAILY
Qty: 20 CAPSULE | Refills: 0 | Status: SHIPPED | OUTPATIENT
Start: 2025-07-25 | End: 2025-08-05

## 2025-07-25 RX ORDER — METRONIDAZOLE 500 MG/1
500 TABLET ORAL 2 TIMES DAILY
Qty: 14 TABLET | Refills: 0 | Status: SHIPPED | OUTPATIENT
Start: 2025-07-25 | End: 2025-08-05

## 2025-07-25 RX ORDER — DICLOFENAC SODIUM 75 MG/1
75 TABLET, DELAYED RELEASE ORAL DAILY
Qty: 30 TABLET | Refills: 2 | Status: SHIPPED | OUTPATIENT
Start: 2025-07-25

## 2025-07-25 RX ORDER — CYCLOBENZAPRINE HCL 10 MG
10 TABLET ORAL 3 TIMES DAILY PRN
Qty: 30 TABLET | Refills: 1 | Status: SHIPPED | OUTPATIENT
Start: 2025-07-25

## 2025-07-25 RX ORDER — CEPHALEXIN 500 MG/1
500 CAPSULE ORAL 4 TIMES DAILY
COMMUNITY
End: 2025-08-05

## 2025-07-25 NOTE — TELEPHONE ENCOUNTER
PATIENT IS REQUESTING REFILLS ON CYCLOBENZAPRINE 10 MG AND DICLOFENAC 75 MG TO BE SENT TO [facility] IN [place].

## 2025-08-05 ENCOUNTER — OFFICE VISIT (OUTPATIENT)
Dept: ORTHOPEDIC SURGERY | Facility: CLINIC | Age: 45
End: 2025-08-05
Payer: COMMERCIAL

## 2025-08-05 VITALS — BODY MASS INDEX: 28.58 KG/M2 | OXYGEN SATURATION: 99 % | WEIGHT: 193 LBS | HEIGHT: 69 IN | HEART RATE: 112 BPM

## 2025-08-05 DIAGNOSIS — G89.29 CHRONIC LEFT SHOULDER PAIN: Primary | ICD-10-CM

## 2025-08-05 DIAGNOSIS — M25.512 CHRONIC LEFT SHOULDER PAIN: Primary | ICD-10-CM

## 2025-08-05 RX ADMIN — TRIAMCINOLONE ACETONIDE 80 MG: 40 INJECTION, SUSPENSION INTRA-ARTICULAR; INTRAMUSCULAR at 10:58

## 2025-08-05 RX ADMIN — LIDOCAINE HYDROCHLORIDE 2 ML: 10 INJECTION, SOLUTION EPIDURAL; INFILTRATION; INTRACAUDAL; PERINEURAL at 10:58

## 2025-08-07 RX ORDER — LIDOCAINE HYDROCHLORIDE 10 MG/ML
2 INJECTION, SOLUTION EPIDURAL; INFILTRATION; INTRACAUDAL; PERINEURAL
Status: COMPLETED | OUTPATIENT
Start: 2025-08-05 | End: 2025-08-05

## 2025-08-07 RX ORDER — TRIAMCINOLONE ACETONIDE 40 MG/ML
80 INJECTION, SUSPENSION INTRA-ARTICULAR; INTRAMUSCULAR
Status: COMPLETED | OUTPATIENT
Start: 2025-08-05 | End: 2025-08-05

## 2025-08-12 ENCOUNTER — OFFICE VISIT (OUTPATIENT)
Dept: FAMILY MEDICINE CLINIC | Facility: CLINIC | Age: 45
End: 2025-08-12
Payer: COMMERCIAL

## 2025-08-12 VITALS
RESPIRATION RATE: 14 BRPM | BODY MASS INDEX: 28.14 KG/M2 | DIASTOLIC BLOOD PRESSURE: 80 MMHG | OXYGEN SATURATION: 97 % | HEIGHT: 69 IN | SYSTOLIC BLOOD PRESSURE: 142 MMHG | HEART RATE: 100 BPM | WEIGHT: 190 LBS

## 2025-08-12 DIAGNOSIS — N41.0 ACUTE PROSTATITIS: Primary | ICD-10-CM

## 2025-08-12 PROCEDURE — 99213 OFFICE O/P EST LOW 20 MIN: CPT | Performed by: PHYSICIAN ASSISTANT

## 2025-08-12 RX ORDER — SULFAMETHOXAZOLE AND TRIMETHOPRIM 800; 160 MG/1; MG/1
1 TABLET ORAL 2 TIMES DAILY
Qty: 60 TABLET | Refills: 0 | Status: SHIPPED | OUTPATIENT
Start: 2025-08-12 | End: 2025-09-11

## (undated) DEVICE — BITEBLOCK ENDO W/STRAP 60F A/ LF DISP

## (undated) DEVICE — CATH BARRX 90 RFA FOCAL 20X13MM 160CM

## (undated) DEVICE — CATH BARRX ULTRLNG RFA FOCAL 40X13MM 162

## (undated) DEVICE — PK ENDO GI 50

## (undated) DEVICE — SINGLE-USE BIOPSY FORCEPS: Brand: RADIAL JAW 4

## (undated) DEVICE — Device

## (undated) DEVICE — BARRX RFA SELF SIZING BALLOON CATHETER: Brand: BARRX